# Patient Record
Sex: FEMALE | Race: WHITE | NOT HISPANIC OR LATINO | Employment: OTHER | URBAN - METROPOLITAN AREA
[De-identification: names, ages, dates, MRNs, and addresses within clinical notes are randomized per-mention and may not be internally consistent; named-entity substitution may affect disease eponyms.]

---

## 2023-05-12 PROBLEM — M10.9 GOUT: Status: ACTIVE | Noted: 2023-05-12

## 2023-05-12 PROBLEM — R35.0 URINARY FREQUENCY: Status: ACTIVE | Noted: 2023-05-12

## 2023-05-12 PROBLEM — G57.61 MORTON'S NEUROMA OF RIGHT FOOT: Status: ACTIVE | Noted: 2023-05-12

## 2023-05-12 PROBLEM — K63.5 COLON POLYPS: Status: ACTIVE | Noted: 2023-05-12

## 2023-05-12 PROBLEM — L30.9 DERMATITIS, ECZEMATOID: Status: ACTIVE | Noted: 2023-05-12

## 2023-05-12 PROBLEM — G47.00 INSOMNIA: Status: ACTIVE | Noted: 2023-05-12

## 2023-05-12 PROBLEM — E78.5 HYPERLIPIDEMIA: Status: ACTIVE | Noted: 2023-05-12

## 2023-05-12 PROBLEM — R94.4 DECREASED GFR: Status: ACTIVE | Noted: 2023-05-12

## 2023-05-12 PROBLEM — F43.22 ADJUSTMENT DISORDER WITH ANXIETY: Status: ACTIVE | Noted: 2023-05-12

## 2023-05-12 PROBLEM — K59.09 CHRONIC CONSTIPATION: Status: ACTIVE | Noted: 2023-05-12

## 2023-05-12 PROBLEM — G47.33 OSA ON CPAP: Status: ACTIVE | Noted: 2023-05-12

## 2023-05-12 RX ORDER — TRAZODONE HYDROCHLORIDE 50 MG/1
TABLET ORAL
COMMUNITY
End: 2023-05-24 | Stop reason: SDUPTHER

## 2023-05-12 RX ORDER — MULTIVITAMIN
1 TABLET ORAL DAILY
COMMUNITY
Start: 2019-03-07

## 2023-05-12 RX ORDER — ATORVASTATIN CALCIUM 20 MG/1
20 TABLET, FILM COATED ORAL NIGHTLY
COMMUNITY
End: 2023-05-24 | Stop reason: SDUPTHER

## 2023-05-12 RX ORDER — OLOPATADINE HYDROCHLORIDE 1 MG/ML
SOLUTION/ DROPS OPHTHALMIC
COMMUNITY
Start: 2022-11-14 | End: 2023-05-24 | Stop reason: ALTCHOICE

## 2023-05-12 RX ORDER — COLCHICINE 0.6 MG/1
1 TABLET ORAL 2 TIMES DAILY
COMMUNITY
Start: 2021-12-03 | End: 2023-05-24 | Stop reason: ALTCHOICE

## 2023-05-12 RX ORDER — CITALOPRAM 20 MG/1
1 TABLET, FILM COATED ORAL DAILY
COMMUNITY
Start: 2017-12-11 | End: 2023-05-24 | Stop reason: SDUPTHER

## 2023-05-12 RX ORDER — ALLOPURINOL 100 MG/1
100 TABLET ORAL DAILY
COMMUNITY
End: 2023-05-24 | Stop reason: SDUPTHER

## 2023-05-15 ENCOUNTER — APPOINTMENT (OUTPATIENT)
Dept: PRIMARY CARE | Facility: CLINIC | Age: 73
End: 2023-05-15
Payer: COMMERCIAL

## 2023-05-24 ENCOUNTER — OFFICE VISIT (OUTPATIENT)
Dept: PRIMARY CARE | Facility: CLINIC | Age: 73
End: 2023-05-24
Payer: MEDICARE

## 2023-05-24 VITALS — DIASTOLIC BLOOD PRESSURE: 68 MMHG | SYSTOLIC BLOOD PRESSURE: 120 MMHG

## 2023-05-24 DIAGNOSIS — M10.9 GOUT, UNSPECIFIED CAUSE, UNSPECIFIED CHRONICITY, UNSPECIFIED SITE: ICD-10-CM

## 2023-05-24 DIAGNOSIS — E78.5 HYPERLIPIDEMIA, UNSPECIFIED HYPERLIPIDEMIA TYPE: ICD-10-CM

## 2023-05-24 DIAGNOSIS — Z12.31 ENCOUNTER FOR SCREENING MAMMOGRAM FOR MALIGNANT NEOPLASM OF BREAST: ICD-10-CM

## 2023-05-24 DIAGNOSIS — R35.0 URINARY FREQUENCY: ICD-10-CM

## 2023-05-24 DIAGNOSIS — Z78.0 POST-MENOPAUSAL: ICD-10-CM

## 2023-05-24 DIAGNOSIS — F43.22 ADJUSTMENT DISORDER WITH ANXIETY: ICD-10-CM

## 2023-05-24 DIAGNOSIS — Z00.00 HEALTH CARE MAINTENANCE: Primary | ICD-10-CM

## 2023-05-24 DIAGNOSIS — G47.00 INSOMNIA, UNSPECIFIED TYPE: ICD-10-CM

## 2023-05-24 LAB
ALANINE AMINOTRANSFERASE (SGPT) (U/L) IN SER/PLAS: 14 U/L (ref 7–45)
ALBUMIN (G/DL) IN SER/PLAS: 4.4 G/DL (ref 3.4–5)
ALKALINE PHOSPHATASE (U/L) IN SER/PLAS: 78 U/L (ref 33–136)
ANION GAP IN SER/PLAS: 14 MMOL/L (ref 10–20)
ASPARTATE AMINOTRANSFERASE (SGOT) (U/L) IN SER/PLAS: 18 U/L (ref 9–39)
BILIRUBIN TOTAL (MG/DL) IN SER/PLAS: 0.4 MG/DL (ref 0–1.2)
CALCIUM (MG/DL) IN SER/PLAS: 9.2 MG/DL (ref 8.6–10.3)
CARBON DIOXIDE, TOTAL (MMOL/L) IN SER/PLAS: 27 MMOL/L (ref 21–32)
CHLORIDE (MMOL/L) IN SER/PLAS: 103 MMOL/L (ref 98–107)
CHOLESTEROL (MG/DL) IN SER/PLAS: 263 MG/DL (ref 0–199)
CHOLESTEROL IN HDL (MG/DL) IN SER/PLAS: 85.3 MG/DL
CHOLESTEROL/HDL RATIO: 3.1
CREATININE (MG/DL) IN SER/PLAS: 0.84 MG/DL (ref 0.5–1.05)
GFR FEMALE: 73 ML/MIN/1.73M2
GLUCOSE (MG/DL) IN SER/PLAS: 99 MG/DL (ref 74–99)
LDL: 159 MG/DL (ref 0–99)
POC APPEARANCE, URINE: CLEAR
POC BILIRUBIN, URINE: NEGATIVE
POC BLOOD, URINE: NEGATIVE
POC COLOR, URINE: YELLOW
POC GLUCOSE, URINE: NEGATIVE MG/DL
POC KETONES, URINE: NEGATIVE MG/DL
POC LEUKOCYTES, URINE: ABNORMAL
POC NITRITE,URINE: NEGATIVE
POC PH, URINE: 5.5 PH
POC PROTEIN, URINE: NEGATIVE MG/DL
POC SPECIFIC GRAVITY, URINE: 1.02
POC UROBILINOGEN, URINE: 0.2 EU/DL
POTASSIUM (MMOL/L) IN SER/PLAS: 4.8 MMOL/L (ref 3.5–5.3)
PROTEIN TOTAL: 6.8 G/DL (ref 6.4–8.2)
SODIUM (MMOL/L) IN SER/PLAS: 139 MMOL/L (ref 136–145)
THYROTROPIN (MIU/L) IN SER/PLAS BY DETECTION LIMIT <= 0.05 MIU/L: 1.92 MIU/L (ref 0.44–3.98)
TRIGLYCERIDE (MG/DL) IN SER/PLAS: 93 MG/DL (ref 0–149)
URATE (MG/DL) IN SER/PLAS: 6.4 MG/DL (ref 2.3–6.7)
UREA NITROGEN (MG/DL) IN SER/PLAS: 21 MG/DL (ref 6–23)
VLDL: 19 MG/DL (ref 0–40)

## 2023-05-24 PROCEDURE — 1160F RVW MEDS BY RX/DR IN RCRD: CPT | Performed by: FAMILY MEDICINE

## 2023-05-24 PROCEDURE — 81003 URINALYSIS AUTO W/O SCOPE: CPT | Performed by: FAMILY MEDICINE

## 2023-05-24 PROCEDURE — 84443 ASSAY THYROID STIM HORMONE: CPT

## 2023-05-24 PROCEDURE — 80053 COMPREHEN METABOLIC PANEL: CPT

## 2023-05-24 PROCEDURE — 1036F TOBACCO NON-USER: CPT | Performed by: FAMILY MEDICINE

## 2023-05-24 PROCEDURE — 99214 OFFICE O/P EST MOD 30 MIN: CPT | Performed by: FAMILY MEDICINE

## 2023-05-24 PROCEDURE — 84550 ASSAY OF BLOOD/URIC ACID: CPT

## 2023-05-24 PROCEDURE — 1159F MED LIST DOCD IN RCRD: CPT | Performed by: FAMILY MEDICINE

## 2023-05-24 PROCEDURE — 80061 LIPID PANEL: CPT

## 2023-05-24 RX ORDER — ATORVASTATIN CALCIUM 20 MG/1
20 TABLET, FILM COATED ORAL NIGHTLY
Qty: 90 TABLET | Refills: 0 | Status: SHIPPED | OUTPATIENT
Start: 2023-05-24 | End: 2023-05-25

## 2023-05-24 RX ORDER — CITALOPRAM 20 MG/1
20 TABLET, FILM COATED ORAL DAILY
Qty: 90 TABLET | Refills: 0 | Status: SHIPPED | OUTPATIENT
Start: 2023-05-24 | End: 2023-09-18 | Stop reason: SDUPTHER

## 2023-05-24 RX ORDER — SULFAMETHOXAZOLE AND TRIMETHOPRIM 800; 160 MG/1; MG/1
1 TABLET ORAL 2 TIMES DAILY
Qty: 6 TABLET | Refills: 0 | Status: SHIPPED | OUTPATIENT
Start: 2023-05-24 | End: 2023-05-27

## 2023-05-24 RX ORDER — TRAZODONE HYDROCHLORIDE 50 MG/1
TABLET ORAL
Qty: 90 TABLET | Refills: 0 | Status: SHIPPED | OUTPATIENT
Start: 2023-05-24 | End: 2023-09-18 | Stop reason: SDUPTHER

## 2023-05-24 RX ORDER — ALLOPURINOL 100 MG/1
100 TABLET ORAL DAILY
Qty: 90 TABLET | Refills: 0 | Status: SHIPPED | OUTPATIENT
Start: 2023-05-24 | End: 2023-08-22

## 2023-05-24 NOTE — PATIENT INSTRUCTIONS
You are due for the bivalent COVID booster.    Follow up fasting (no alcohol for 48 hours and just water for 14 hours) in six months for your next routine appointment.  In general, take any medications on schedule (except for types of Insulin).

## 2023-05-24 NOTE — LETTER
June 5, 2023     Munira Bowen  [id number] [facility] [id number]      Dear Ms. Bowen:    Below are the results from your recent visit:    Resulted Orders   Comprehensive Metabolic Panel   Result Value Ref Range    Glucose 99 74 - 99 mg/dL    Sodium 139 136 - 145 mmol/L    Potassium 4.8 3.5 - 5.3 mmol/L    Chloride 103 98 - 107 mmol/L    Bicarbonate 27 21 - 32 mmol/L    Anion Gap 14 10 - 20 mmol/L    Urea Nitrogen 21 6 - 23 mg/dL    Creatinine 0.84 0.50 - 1.05 mg/dL    GFR Female 73 >90 mL/min/1.73m2      Comment:       CALCULATIONS OF ESTIMATED GFR ARE PERFORMED   USING THE 2021 CKD-EPI STUDY REFIT EQUATION   WITHOUT THE RACE VARIABLE FOR THE IDMS-TRACEABLE   CREATININE METHODS.    https://jasn.asnjournals.org/content/early/2021/09/22/ASN.4789091485    Calcium 9.2 8.6 - 10.3 mg/dL    Albumin 4.4 3.4 - 5.0 g/dL    Alkaline Phosphatase 78 33 - 136 U/L    Total Protein 6.8 6.4 - 8.2 g/dL    AST 18 9 - 39 U/L    Total Bilirubin 0.4 0.0 - 1.2 mg/dL    ALT (SGPT) 14 7 - 45 U/L      Comment:       Patients treated with Sulfasalazine may generate    falsely decreased results for ALT.   Lipid Panel   Result Value Ref Range    Cholesterol 263 (H) 0 - 199 mg/dL      Comment:      .      AGE      DESIRABLE   BORDERLINE HIGH   HIGH     0-19 Y     0 - 169       170 - 199     >/= 200    20-24 Y     0 - 189       190 - 224     >/= 225         >24 Y     0 - 199       200 - 239     >/= 240   **All ranges are based on fasting samples. Specific   therapeutic targets will vary based on patient-specific   cardiac risk.  .   Pediatric guidelines reference:Pediatrics 2011, 128(S5).   Adult guidelines reference: NCEP ATPIII Guidelines,     SESAR 2001, 258:2486-97  .   Venipuncture immediately after or during the    administration of Metamizole may lead to falsely   low results. Testing should be performed immediately   prior to Metamizole dosing.    HDL 85.3 mg/dL      Comment:      .      AGE      VERY LOW   LOW      NORMAL    HIGH       0-19 Y       < 35   < 40     40-45     ----    20-24 Y       ----   < 40       >45     ----      >24 Y       ----   < 40     40-60      >60  .    Cholesterol/HDL Ratio 3.1       Comment:      REF VALUES  DESIRABLE  < 3.4  HIGH RISK  > 5.0     (H) 0 - 99 mg/dL      Comment:      .                           NEAR      BORD      AGE      DESIRABLE  OPTIMAL    HIGH     HIGH     VERY HIGH     0-19 Y     0 - 109     ---    110-129   >/= 130     ----    20-24 Y     0 - 119     ---    120-159   >/= 160     ----      >24 Y     0 -  99   100-129  130-159   160-189     >/=190  .    VLDL 19 0 - 40 mg/dL    Triglycerides 93 0 - 149 mg/dL      Comment:      .      AGE      DESIRABLE   BORDERLINE HIGH   HIGH     VERY HIGH   0 D-90 D    19 - 174         ----         ----        ----  91 D- 9 Y     0 -  74        75 -  99     >/= 100      ----    10-19 Y     0 -  89        90 - 129     >/= 130      ----    20-24 Y     0 - 114       115 - 149     >/= 150      ----         >24 Y     0 - 149       150 - 199    200- 499    >/= 500  .   Venipuncture immediately after or during the    administration of Metamizole may lead to falsely   low results. Testing should be performed immediately   prior to Metamizole dosing.   Thyroid Stimulating Hormone   Result Value Ref Range    TSH 1.92 0.44 - 3.98 mIU/L      Comment:       TSH testing is performed using different testing    methodology at [facility] than at other    [facility]. Direct result comparisons should    only be made within the same method.   POCT UA Automated manually resulted   Result Value Ref Range    POC Color, Urine Yellow Straw, Yellow, Light Yellow    POC Appearance, Urine Clear Clear    POC Specific Gravity, Urine 1.020 1.005 - 1.035    POC PH, Urine 5.5 No Reference Range Established PH    POC Protein, Urine NEGATIVE NEGATIVE, 30 (1+) mg/dl    POC Glucose, Urine NEGATIVE NEGATIVE mg/dl    POC Blood, Urine NEGATIVE NEGATIVE     POC Ketones, Urine NEGATIVE NEGATIVE mg/dl    POC Bilirubin, Urine NEGATIVE NEGATIVE    POC Urobilinogen, Urine 0.2 0.2, 1.0 EU/DL    Poc Nitrate, Urine NEGATIVE NEGATIVE    POC Leukocytes, Urine TRACE (A) NEGATIVE   Uric Acid   Result Value Ref Range    Uric Acid 6.4 2.3 - 6.7 mg/dL      Comment:       Venipuncture immediately after or during the    administration of Metamizole may lead to falsely   low results. Testing should be performed immediately   prior to Metamizole dosing.   XR DEXA bone density    Narrative    Interpreted By:  OSBALDO PAULSON MD  Name: ERIK OLIVER    Patient ID: [id number] YOB: 1950 Height: 58.0 in.      Gender:     Female   Exam Date:  06/02/2023 Weight: 130.0 lbs.    Indications: former tobacco user    Fractures:                          Treatments:                            LEFT FEMUR - TOTAL   The bone mineral density : 0.871 g/cm2   T-score : -1.1    % of young normal mean :86 %   Z-score : 0.5    % of age matched mean : 108 %    % change vs. Previous : -     % change vs. Baseline : baseline    *Indicates significant change based on 95% confidence interval.      LEFT FEMUR - NECK   The bone mineral density : 0.797 g/cm2   T-score : -1.7    % of young normal mean: 77 %   Z-score : 0.1    % of age matched mean : 101 %    % change vs. Previous : -     % change vs. Baseline : baseline    *Indicates significant change based on 95% confidence interval.      SPINE L1-L4   The bone mineral density is 1.366 g/cm2   T-score : 1.5   % of young normal mean is 116 %   Z-score : 3.3    % of age matched mean is 140 %    % change vs. Previous : -     % change vs. Baseline : baseline    *Indicates significant change based on 95% confidence interval.       World Health Organization (WHO) criteria for post-menopausal,    Women:     Normal:       T-score at or above -1 SD          Osteopenia:   T-score between -1 and -2.5 SD     Osteoporosis: T-score at or below -2.5 SD            10-Year Fracture Risk:   Major Osteoporotic Fracture 11.4 %    Hip Fracture                2.2 %     Reported Risk Factors       None         Interpretation:   The BMD measured at Femur Neck is 0.797 g/cm2 with a T-score of -1.7   is considered moderately low.  Treatment is advised if there are   other risk factors.       Follow up recommended on June 2025 or sooner as clinically warranted.     The test results show that your current treatment is working. Please {Therapies; lab letter directions:23656}. We recommend that you repeat the above test(s) in {Numbers; 1-10:12971} {Time; units w/plural:11}.    If you have any questions or concerns, please don't hesitate to call.         Sincerely,        Smith Avina MD

## 2023-05-24 NOTE — PROGRESS NOTES
Subjective   Patient ID: [id number]     Munira Bowen is a 72 y.o. female who presents for Med Management.    HPI  [place] weepy this morning;  describes productive therapeutic relationship with Jenise Heaton;  sleeping well and meds appear to be appropriate.  Review of Systems    CARDIO- No chest pain or pressure, nausea, diaphoresis, paresthesias, dizziness, or syncope with or without exertion  GI-No blood in stool, tarry stools, pain, vomiting, heartburn, constipation or diarrhea  PULM-No wheezing, coughing or shortness of breath  UROL-No frequency, urgency, blood in urine, or incontinence  ENDO- No change in hair, voice, skin, weight or temperature tolerance   MSK-No locking, giving way/swelling of joints  NEURO- No headaches, hx of concussion, falls in the last year or seizure  DERM-No rashes, blanching or change in any moles    Objective     /68 (BP Location: Left arm, Patient Position: Sitting)      Physical Exam    Neck-supple without lymphadenopathy or thyromegaly; no carotid bruits  Throat- without erythema or exudate, uvula in midlineNeck-supple without lymphadenopathy or thyromegaly; no carotid bruits  Heart- regular rate and rhythm, normal s1 and s2 without murmur or gallop  Lungs-clear to auscultation  Abdomen-soft, positive bowel sounds, without masses, HSmegaly or pain     Assessment/Plan     Problem List Items Addressed This Visit          Other    Gout    Relevant Orders    Uric Acid    Hyperlipidemia    Relevant Orders    Comprehensive Metabolic Panel    Lipid Panel    Thyroid Stimulating Hormone    Urinary frequency    Relevant Orders    POCT UA Automated manually resulted    Health care maintenance - Primary    Relevant Orders    BI mammo bilateral screening tomosynthesis    Post-menopausal    Relevant Orders    XR DEXA bone density     Other Visit Diagnoses       Encounter for screening mammogram for malignant neoplasm of breast        Relevant Orders    BI mammo bilateral screening  tomosynthesis          You are due for the bivalent COVID booster.    Follow up fasting (no alcohol for 48 hours and just water for 14 hours) in six months for your next routine appointment.  In general, take any medications on schedule (except for types of Insulin).    Smith Avina MD

## 2023-05-25 RX ORDER — ATORVASTATIN CALCIUM 40 MG/1
40 TABLET, FILM COATED ORAL DAILY
Qty: 90 TABLET | Refills: 1 | Status: SHIPPED | OUTPATIENT
Start: 2023-05-25 | End: 2023-11-29 | Stop reason: SDUPTHER

## 2023-05-26 ENCOUNTER — LAB (OUTPATIENT)
Dept: LAB | Facility: LAB | Age: 73
End: 2023-05-26
Payer: MEDICARE

## 2023-05-26 DIAGNOSIS — R35.0 URINARY FREQUENCY: ICD-10-CM

## 2023-05-26 PROCEDURE — 87086 URINE CULTURE/COLONY COUNT: CPT

## 2023-05-27 LAB — URINE CULTURE: NORMAL

## 2023-06-05 PROBLEM — M85.88 OSTEOPENIA OF LUMBAR SPINE: Status: ACTIVE | Noted: 2023-06-05

## 2023-09-14 ENCOUNTER — OFFICE VISIT (OUTPATIENT)
Dept: PRIMARY CARE | Facility: CLINIC | Age: 73
End: 2023-09-14
Payer: MEDICARE

## 2023-09-14 DIAGNOSIS — Z23 NEED FOR VACCINATION: ICD-10-CM

## 2023-09-14 PROCEDURE — 1036F TOBACCO NON-USER: CPT | Performed by: FAMILY MEDICINE

## 2023-09-14 PROCEDURE — 90662 IIV NO PRSV INCREASED AG IM: CPT | Performed by: FAMILY MEDICINE

## 2023-09-14 PROCEDURE — G0008 ADMIN INFLUENZA VIRUS VAC: HCPCS | Performed by: FAMILY MEDICINE

## 2023-09-14 PROCEDURE — 1159F MED LIST DOCD IN RCRD: CPT | Performed by: FAMILY MEDICINE

## 2023-09-14 PROCEDURE — 1126F AMNT PAIN NOTED NONE PRSNT: CPT | Performed by: FAMILY MEDICINE

## 2023-09-14 PROCEDURE — 1160F RVW MEDS BY RX/DR IN RCRD: CPT | Performed by: FAMILY MEDICINE

## 2023-09-15 ENCOUNTER — TELEPHONE (OUTPATIENT)
Dept: PRIMARY CARE | Facility: CLINIC | Age: 73
End: 2023-09-15
Payer: MEDICARE

## 2023-09-15 DIAGNOSIS — F43.22 ADJUSTMENT DISORDER WITH ANXIETY: ICD-10-CM

## 2023-09-15 DIAGNOSIS — G47.00 INSOMNIA, UNSPECIFIED TYPE: ICD-10-CM

## 2023-09-15 NOTE — TELEPHONE ENCOUNTER
REFILL  MEDICATION:     Trazodone 50 MG; Take 1/2 to 1 tablet at bedtime to help with sleep.    Citalopram 20 MG; Take 1 tablet once daily.     PHARM: [facility]   PHARM NUMBER: [phone]    LR: 5-24-23   90 tablets with 0 refills for both medications   LV: 5-24-23  NV: 11-24-23

## 2023-09-18 RX ORDER — TRAZODONE HYDROCHLORIDE 50 MG/1
TABLET ORAL
Qty: 90 TABLET | Refills: 0 | Status: SHIPPED | OUTPATIENT
Start: 2023-09-18 | End: 2024-02-23 | Stop reason: SDUPTHER

## 2023-09-18 RX ORDER — CITALOPRAM 20 MG/1
20 TABLET, FILM COATED ORAL DAILY
Qty: 90 TABLET | Refills: 0 | Status: SHIPPED | OUTPATIENT
Start: 2023-09-18 | End: 2024-02-23 | Stop reason: SDUPTHER

## 2023-11-24 ENCOUNTER — APPOINTMENT (OUTPATIENT)
Dept: PRIMARY CARE | Facility: CLINIC | Age: 73
End: 2023-11-24
Payer: MEDICARE

## 2023-11-29 ENCOUNTER — OFFICE VISIT (OUTPATIENT)
Dept: PRIMARY CARE | Facility: CLINIC | Age: 73
End: 2023-11-29
Payer: MEDICARE

## 2023-11-29 VITALS
TEMPERATURE: 97.6 F | BODY MASS INDEX: 27.14 KG/M2 | WEIGHT: 131 LBS | DIASTOLIC BLOOD PRESSURE: 85 MMHG | SYSTOLIC BLOOD PRESSURE: 143 MMHG

## 2023-11-29 DIAGNOSIS — G47.33 OSA ON CPAP: ICD-10-CM

## 2023-11-29 DIAGNOSIS — F43.22 ADJUSTMENT DISORDER WITH ANXIETY: Primary | ICD-10-CM

## 2023-11-29 DIAGNOSIS — H10.11 ALLERGIC CONJUNCTIVITIS OF RIGHT EYE: ICD-10-CM

## 2023-11-29 DIAGNOSIS — E78.5 HYPERLIPIDEMIA, UNSPECIFIED HYPERLIPIDEMIA TYPE: ICD-10-CM

## 2023-11-29 PROBLEM — H91.93 BILATERAL HEARING LOSS: Status: ACTIVE | Noted: 2023-11-29

## 2023-11-29 LAB
POC FINGERSTICK BLOOD GLUCOSE: 101 MG/DL (ref 70–100)
POC HDL CHOLESTEROL: 70 MG/DL (ref 0–50)
POC LDL CHOLESTEROL: 108 MG/DL (ref 0–100)
POC NON-HDL CHOLESTEROL: 125 MG/DL (ref 0–130)
POC TOTAL CHOLESTEROL/HDL RATIO: 2.8 (ref 0–4.5)
POC TOTAL CHOLESTEROL: 195 MG/DL (ref 0–199)
POC TRIGLYCERIDES: 85 MG/DL (ref 0–150)

## 2023-11-29 PROCEDURE — 1036F TOBACCO NON-USER: CPT | Performed by: FAMILY MEDICINE

## 2023-11-29 PROCEDURE — 1159F MED LIST DOCD IN RCRD: CPT | Performed by: FAMILY MEDICINE

## 2023-11-29 PROCEDURE — 1123F ACP DISCUSS/DSCN MKR DOCD: CPT | Performed by: FAMILY MEDICINE

## 2023-11-29 PROCEDURE — G0439 PPPS, SUBSEQ VISIT: HCPCS | Performed by: FAMILY MEDICINE

## 2023-11-29 PROCEDURE — 82962 GLUCOSE BLOOD TEST: CPT | Performed by: FAMILY MEDICINE

## 2023-11-29 PROCEDURE — 1170F FXNL STATUS ASSESSED: CPT | Performed by: FAMILY MEDICINE

## 2023-11-29 PROCEDURE — 1126F AMNT PAIN NOTED NONE PRSNT: CPT | Performed by: FAMILY MEDICINE

## 2023-11-29 PROCEDURE — 1160F RVW MEDS BY RX/DR IN RCRD: CPT | Performed by: FAMILY MEDICINE

## 2023-11-29 PROCEDURE — 99214 OFFICE O/P EST MOD 30 MIN: CPT | Performed by: FAMILY MEDICINE

## 2023-11-29 PROCEDURE — 80061 LIPID PANEL: CPT | Performed by: FAMILY MEDICINE

## 2023-11-29 RX ORDER — ERYTHROMYCIN 5 MG/G
OINTMENT OPHTHALMIC
COMMUNITY

## 2023-11-29 RX ORDER — OLOPATADINE HYDROCHLORIDE 1 MG/ML
1 SOLUTION/ DROPS OPHTHALMIC 2 TIMES DAILY
COMMUNITY

## 2023-11-29 RX ORDER — ATORVASTATIN CALCIUM 40 MG/1
40 TABLET, FILM COATED ORAL DAILY
Qty: 90 TABLET | Refills: 1 | Status: SHIPPED | OUTPATIENT
Start: 2023-11-29 | End: 2024-02-23 | Stop reason: SDUPTHER

## 2023-11-29 ASSESSMENT — ENCOUNTER SYMPTOMS
DEPRESSION: 1
LOSS OF SENSATION IN FEET: 0
OCCASIONAL FEELINGS OF UNSTEADINESS: 0

## 2023-11-29 ASSESSMENT — ACTIVITIES OF DAILY LIVING (ADL)
GROCERY_SHOPPING: INDEPENDENT
TAKING_MEDICATION: INDEPENDENT
DOING_HOUSEWORK: INDEPENDENT
BATHING: INDEPENDENT
DRESSING: INDEPENDENT
MANAGING_FINANCES: INDEPENDENT

## 2023-11-29 ASSESSMENT — PATIENT HEALTH QUESTIONNAIRE - PHQ9
2. FEELING DOWN, DEPRESSED OR HOPELESS: NOT AT ALL
SUM OF ALL RESPONSES TO PHQ9 QUESTIONS 1 AND 2: 0
1. LITTLE INTEREST OR PLEASURE IN DOING THINGS: NOT AT ALL

## 2023-11-29 NOTE — PATIENT INSTRUCTIONS
You are eligible for the COVID booster.    Follow up fasting (no alcohol for 48 hours and just water for 14 hours) in six months for your next routine appointment.  In general, take any medications on schedule (except for types of Insulin).

## 2023-11-29 NOTE — PROGRESS NOTES
Subjective   Reason for Visit: Munira Bowen is an 73 y.o. female here for a Medicare Wellness visit.        In the past 2 weeks this patient has not felt down, depressed, hopeless, lost interest or pleasure in doing things or thought of harming herself or others. The patient has not fallen in the last six months and has no loose rugs,  uneven floors or poor lighting at home.Advance Care Planning discussion was held.  The patient has no spiritual/Sabianist/cultural values or needs that we need to know. The patient does not feel threatened or abused physically, emotionally or sexually.  The patient feels safe in the home.  In the past month, there was not a day when the patient of anyone in the patient's family went hungry because there was not enough food.  The patient denies that they or the person with them has problems with hearing, speaking, reading, moving around or learning.  The patient states they are comfortable filling out medical forms.    Reviewed all medications by prescribing practitioner or clinical pharmacist (such as prescriptions, OTCs, herbal therapies and supplements) and documented in the medical record.    HPI  Taking meds as directed without tolerability or affordability issues; no complaints today.    Patient Care Team:  Smith Avina MD as PCP - General  Smith Avina MD as PCP - [facility] Attributed Provider     Review of Systems  General-denies weakness or myalgias  Opth-no dry eyes, or blurry vision; has had a few days of itchy right eye and using olpatidine drops  ENT-No , tinnitus or vertigo  Neck-no unexplained swelling in neck or pain  ENDO-no voice, skin, hair or change in temperature tolerance  HEM-no unexplained bruising or bleeding  PSYCH-mood is good; waking up rested without gasping or restless legs;  CPAP is too bothersome and so she stopped using it;  sleeps well with the trazodone    Objective   Vitals:  /85 (BP Location: Right arm, Patient Position: Sitting)    Temp 36.4 °C (97.6 °F) (Oral)   Wt 59.4 kg (131 lb)   BMI 27.14 kg/m²       Physical Exam  General- well defined, well nourished, well hydrated individual in NAD  Eyes-pupils equal round, reactive to light and accommodation, fundi with normal cup/disc ratio, conjunctiva on right with mild redness or discharge  Head-normocephalic without masses or tenderness  Ears-normal pinnae, and canals, with normal landmarks and light reflex of tympanic membranes bilaterally  Nose-septum in the midline, normal mucosa bilaterally  Throat- without erythema or exudate, uvula in midline  Neck-supple without lymphadenopathy or thyromegaly; no carotid bruits  Heart- regular rate and rhythm, normal s1 and s2 without murmur or gallop;  no edema  Lungs-clear to auscultation  Abdomen-soft, positive bowel sounds, without masses, HSmegaly or pain     Assessment/Plan   Problem List Items Addressed This Visit       Adjustment disorder with anxiety - Primary    Hyperlipidemia    Relevant Medications    atorvastatin (Lipitor) 40 mg tablet    Other Relevant Orders    POCT fingerstick glucose manually resulted    POCT Lipid Panel manually resulted    ALCIDES on CPAP    Allergic conjunctivitis of right eye     You are eligible for the COVID booster.    Follow up fasting (no alcohol for 48 hours and just water for 14 hours) in six months for your next routine appointment.  In general, take any medications on schedule (except for types of Insulin).

## 2024-01-17 ENCOUNTER — TELEPHONE (OUTPATIENT)
Dept: PRIMARY CARE | Facility: CLINIC | Age: 74
End: 2024-01-17
Payer: MEDICARE

## 2024-01-17 DIAGNOSIS — M10.9 GOUT, UNSPECIFIED CAUSE, UNSPECIFIED CHRONICITY, UNSPECIFIED SITE: ICD-10-CM

## 2024-01-17 RX ORDER — ALLOPURINOL 100 MG/1
100 TABLET ORAL DAILY
Qty: 30 TABLET | Refills: 11 | Status: SHIPPED | OUTPATIENT
Start: 2024-01-17 | End: 2024-02-23 | Stop reason: SDUPTHER

## 2024-01-17 NOTE — TELEPHONE ENCOUNTER
Refill:    Allopurinol 100mg daily    Pharm: [facility] # [phone]    LR: 05/24/23 qty 90 no refills  LV: 11/29/23  NV: 02/28/24

## 2024-02-22 PROBLEM — K63.5 COLON POLYPS: Status: RESOLVED | Noted: 2023-05-12 | Resolved: 2024-02-22

## 2024-02-22 PROBLEM — N18.2 CKD (CHRONIC KIDNEY DISEASE) STAGE 2, GFR 60-89 ML/MIN: Status: ACTIVE | Noted: 2023-05-12

## 2024-02-23 ENCOUNTER — OFFICE VISIT (OUTPATIENT)
Dept: PRIMARY CARE | Facility: CLINIC | Age: 74
End: 2024-02-23
Payer: MEDICARE

## 2024-02-23 VITALS
BODY MASS INDEX: 27.77 KG/M2 | DIASTOLIC BLOOD PRESSURE: 80 MMHG | WEIGHT: 134 LBS | SYSTOLIC BLOOD PRESSURE: 131 MMHG | TEMPERATURE: 98.5 F

## 2024-02-23 DIAGNOSIS — J06.9 VIRAL UPPER RESPIRATORY TRACT INFECTION: ICD-10-CM

## 2024-02-23 DIAGNOSIS — H10.11 ALLERGIC CONJUNCTIVITIS OF RIGHT EYE: ICD-10-CM

## 2024-02-23 DIAGNOSIS — M85.88 OSTEOPENIA OF LUMBAR SPINE: ICD-10-CM

## 2024-02-23 DIAGNOSIS — M10.9 GOUT, UNSPECIFIED CAUSE, UNSPECIFIED CHRONICITY, UNSPECIFIED SITE: ICD-10-CM

## 2024-02-23 DIAGNOSIS — F43.22 ADJUSTMENT DISORDER WITH ANXIETY: ICD-10-CM

## 2024-02-23 DIAGNOSIS — L30.9 ECZEMA, UNSPECIFIED TYPE: ICD-10-CM

## 2024-02-23 DIAGNOSIS — N18.2 CKD (CHRONIC KIDNEY DISEASE) STAGE 2, GFR 60-89 ML/MIN: ICD-10-CM

## 2024-02-23 DIAGNOSIS — K59.09 CHRONIC CONSTIPATION: ICD-10-CM

## 2024-02-23 DIAGNOSIS — E78.5 HYPERLIPIDEMIA, UNSPECIFIED HYPERLIPIDEMIA TYPE: Primary | ICD-10-CM

## 2024-02-23 DIAGNOSIS — R35.0 URINARY FREQUENCY: ICD-10-CM

## 2024-02-23 DIAGNOSIS — G47.00 INSOMNIA, UNSPECIFIED TYPE: ICD-10-CM

## 2024-02-23 PROCEDURE — 1126F AMNT PAIN NOTED NONE PRSNT: CPT | Performed by: FAMILY MEDICINE

## 2024-02-23 PROCEDURE — 1036F TOBACCO NON-USER: CPT | Performed by: FAMILY MEDICINE

## 2024-02-23 PROCEDURE — 99214 OFFICE O/P EST MOD 30 MIN: CPT | Performed by: FAMILY MEDICINE

## 2024-02-23 PROCEDURE — 1159F MED LIST DOCD IN RCRD: CPT | Performed by: FAMILY MEDICINE

## 2024-02-23 PROCEDURE — 1157F ADVNC CARE PLAN IN RCRD: CPT | Performed by: FAMILY MEDICINE

## 2024-02-23 RX ORDER — ATORVASTATIN CALCIUM 40 MG/1
40 TABLET, FILM COATED ORAL DAILY
Qty: 90 TABLET | Refills: 1 | Status: SHIPPED | OUTPATIENT
Start: 2024-02-23 | End: 2024-08-21

## 2024-02-23 RX ORDER — ALLOPURINOL 100 MG/1
100 TABLET ORAL DAILY
Qty: 90 TABLET | Refills: 1 | Status: SHIPPED | OUTPATIENT
Start: 2024-02-23 | End: 2024-08-21

## 2024-02-23 RX ORDER — TRAZODONE HYDROCHLORIDE 50 MG/1
TABLET ORAL
Qty: 90 TABLET | Refills: 0 | Status: SHIPPED | OUTPATIENT
Start: 2024-02-23

## 2024-02-23 RX ORDER — CITALOPRAM 20 MG/1
20 TABLET, FILM COATED ORAL DAILY
Qty: 90 TABLET | Refills: 1 | Status: SHIPPED | OUTPATIENT
Start: 2024-02-23 | End: 2024-08-21

## 2024-02-23 NOTE — PATIENT INSTRUCTIONS
You are eligible for the COVID booster.  Without a recent (within 90 days) challenge (booster or infection) your immune system will be three days behind in protecting you from the infection.  You will infect approximately five people by that time.    Follow up fasting (no alcohol for 48 hours and just water for 14 hours) in six months for your next routine appointment.  In general, take any medications on schedule (except for types of Insulin).

## 2024-02-23 NOTE — PROGRESS NOTES
Subjective   Patient ID: [id number]     Munira Bowen is a 73 y.o. female who presents for Med Management.    HPI  Taking meds as directed without tolerability or affordability issues; no complaints today.;  had COVID on 02FEB2024 and Munira got URI 13FEB2024 wit a negative COVID test on 21FEB2024    Review of Systems  CARDIO- No chest pain or pressure, nausea, diaphoresis, paresthesias, dizziness, or syncope with or without exertion  GI-No blood in stool, tarry stools, pain, vomiting, heartburn, constipation or diarrhea  PULM-No wheezing, or shortness of breath; coughing that is nonproductive  UROL-No frequency, urgency, blood in urine, or incontinence  Musculoskeletal-No locking, giving way/swelling of joints  Neurology- No headaches, hx of concussion, falls in the last year or seizure  Allergy/Immunology-No history of  itching;  has occasional sneezing   Dermatology-No rashes, blanching or  change in any moles;  still with dry skin    Objective     /80 (BP Location: Left arm, Patient Position: Sitting)   Temp 36.9 °C (98.5 °F) (Oral)   Wt 60.8 kg (134 lb)   BMI 27.77 kg/m²      Physical Exam  General- well defined, well nourished, well hydrated individual in NAD  Eyes-conjunctiva without redness or discharge  Head-normocephalic without masses or tenderness  Ears-normal pinnae, and canals, with normal landmarks and light reflex of tympanic membranes bilaterally  Nose-septum in the midline, normal mucosa bilaterally  Throat- without erythema or exudate, uvula in midline  Neck-suple without lymphadenopathy or thyromegaly; no carotid bruits  Throat- without erythema or exudate, uvula in midlineNeck-supple without lymphadenopathy or thyromegaly; no carotid bruits  Heart- regular rate and rhythm, normal s1 and s2 without murmur or gallop  Lungs-clear to auscultation  Abdomen-soft, positive bowel sounds, without masses, HSmegaly or pain     Assessment/Plan     Problem List Items Addressed This Visit        Adjustment disorder with anxiety    Relevant Medications    citalopram (CeleXA) 20 mg tablet    RESOLVED: Chronic constipation    CKD (chronic kidney disease) stage 2, GFR 60-89 ml/min    Dermatitis, eczematoid    Gout    Relevant Medications    allopurinol (Zyloprim) 100 mg tablet    Other Relevant Orders    Uric acid    Hyperlipidemia - Primary    Relevant Medications    atorvastatin (Lipitor) 40 mg tablet    Other Relevant Orders    TSH    Lipid panel    Comprehensive metabolic panel    Insomnia    Relevant Medications    traZODone (Desyrel) 50 mg tablet    Urinary frequency    Relevant Orders    POCT UA Automated manually resulted    Osteopenia of lumbar spine    RESOLVED: Allergic conjunctivitis of right eye    URI (upper respiratory infection)     This Viral Upper Respiratory Infection should resolve on its own after a total of 10 to 14 days.  Please call if you develop shortness of breath, a persistent temperature over 100 degrees in 48 hours, or feel worse in any way.   Remember to push fluids, get plenty of rest and take Tylenol and/or Chloraseptic products for discomfort.    You are eligible for the COVID booster.  Without a recent (within 90 days) challenge (booster or infection) your immune system will be three days behind in protecting you from the infection.  You will infect approximately five people by that time.    Your kidney function is impaired.  Make sure that you do not become dehydrated, and take only Tylenol for pain, avoiding all NSAIDS.    Follow up fasting (no alcohol for 48 hours and just water for 14 hours) in six months for your next routine appointment.  In general, take any medications on schedule (except for types of Insulin).      Smith Avina MD

## 2024-03-19 ENCOUNTER — LAB (OUTPATIENT)
Dept: LAB | Facility: LAB | Age: 74
End: 2024-03-19
Payer: MEDICARE

## 2024-03-19 DIAGNOSIS — E78.5 HYPERLIPIDEMIA, UNSPECIFIED HYPERLIPIDEMIA TYPE: ICD-10-CM

## 2024-03-19 DIAGNOSIS — M10.9 GOUT, UNSPECIFIED CAUSE, UNSPECIFIED CHRONICITY, UNSPECIFIED SITE: ICD-10-CM

## 2024-03-19 LAB
ALBUMIN SERPL BCP-MCNC: 4.2 G/DL (ref 3.4–5)
ALP SERPL-CCNC: 83 U/L (ref 33–136)
ALT SERPL W P-5'-P-CCNC: 14 U/L (ref 7–45)
ANION GAP SERPL CALC-SCNC: 10 MMOL/L (ref 10–20)
AST SERPL W P-5'-P-CCNC: 19 U/L (ref 9–39)
BILIRUB SERPL-MCNC: 0.6 MG/DL (ref 0–1.2)
BUN SERPL-MCNC: 13 MG/DL (ref 6–23)
CALCIUM SERPL-MCNC: 9.1 MG/DL (ref 8.6–10.6)
CHLORIDE SERPL-SCNC: 104 MMOL/L (ref 98–107)
CO2 SERPL-SCNC: 29 MMOL/L (ref 21–32)
CREAT SERPL-MCNC: 0.78 MG/DL (ref 0.5–1.05)
EGFRCR SERPLBLD CKD-EPI 2021: 80 ML/MIN/1.73M*2
GLUCOSE SERPL-MCNC: 94 MG/DL (ref 74–99)
POTASSIUM SERPL-SCNC: 3.9 MMOL/L (ref 3.5–5.3)
PROT SERPL-MCNC: 6.4 G/DL (ref 6.4–8.2)
SODIUM SERPL-SCNC: 139 MMOL/L (ref 136–145)
TSH SERPL-ACNC: 2.24 MIU/L (ref 0.44–3.98)
URATE SERPL-MCNC: 5.5 MG/DL (ref 2.3–6.7)

## 2024-03-19 PROCEDURE — 84550 ASSAY OF BLOOD/URIC ACID: CPT

## 2024-03-19 PROCEDURE — 84443 ASSAY THYROID STIM HORMONE: CPT

## 2024-03-19 PROCEDURE — 36415 COLL VENOUS BLD VENIPUNCTURE: CPT

## 2024-03-19 PROCEDURE — 80053 COMPREHEN METABOLIC PANEL: CPT

## 2024-05-14 ENCOUNTER — HOSPITAL ENCOUNTER (EMERGENCY)
Facility: HOSPITAL | Age: 74
Discharge: HOME | End: 2024-05-14
Attending: STUDENT IN AN ORGANIZED HEALTH CARE EDUCATION/TRAINING PROGRAM
Payer: MEDICARE

## 2024-05-14 ENCOUNTER — APPOINTMENT (OUTPATIENT)
Dept: CARDIOLOGY | Facility: HOSPITAL | Age: 74
End: 2024-05-14
Payer: MEDICARE

## 2024-05-14 VITALS
TEMPERATURE: 97.7 F | BODY MASS INDEX: 27.29 KG/M2 | DIASTOLIC BLOOD PRESSURE: 86 MMHG | HEIGHT: 58 IN | WEIGHT: 130 LBS | OXYGEN SATURATION: 97 % | SYSTOLIC BLOOD PRESSURE: 148 MMHG | HEART RATE: 74 BPM | RESPIRATION RATE: 18 BRPM

## 2024-05-14 DIAGNOSIS — M79.605 PAIN OF LEFT LOWER EXTREMITY: Primary | ICD-10-CM

## 2024-05-14 DIAGNOSIS — M79.605 PAIN IN LEFT LEG: ICD-10-CM

## 2024-05-14 PROCEDURE — 99284 EMERGENCY DEPT VISIT MOD MDM: CPT | Mod: 25

## 2024-05-14 PROCEDURE — 93970 EXTREMITY STUDY: CPT

## 2024-05-14 PROCEDURE — 99284 EMERGENCY DEPT VISIT MOD MDM: CPT | Performed by: STUDENT IN AN ORGANIZED HEALTH CARE EDUCATION/TRAINING PROGRAM

## 2024-05-14 ASSESSMENT — COLUMBIA-SUICIDE SEVERITY RATING SCALE - C-SSRS
1. IN THE PAST MONTH, HAVE YOU WISHED YOU WERE DEAD OR WISHED YOU COULD GO TO SLEEP AND NOT WAKE UP?: NO
6. HAVE YOU EVER DONE ANYTHING, STARTED TO DO ANYTHING, OR PREPARED TO DO ANYTHING TO END YOUR LIFE?: NO
2. HAVE YOU ACTUALLY HAD ANY THOUGHTS OF KILLING YOURSELF?: NO

## 2024-05-14 ASSESSMENT — PAIN DESCRIPTION - PROGRESSION: CLINICAL_PROGRESSION: NOT CHANGED

## 2024-05-14 ASSESSMENT — LIFESTYLE VARIABLES
EVER FELT BAD OR GUILTY ABOUT YOUR DRINKING: NO
HAVE YOU EVER FELT YOU SHOULD CUT DOWN ON YOUR DRINKING: NO
HAVE PEOPLE ANNOYED YOU BY CRITICIZING YOUR DRINKING: NO
EVER HAD A DRINK FIRST THING IN THE MORNING TO STEADY YOUR NERVES TO GET RID OF A HANGOVER: NO
TOTAL SCORE: 0

## 2024-05-14 ASSESSMENT — PAIN - FUNCTIONAL ASSESSMENT: PAIN_FUNCTIONAL_ASSESSMENT: 0-10

## 2024-05-14 ASSESSMENT — PAIN SCALES - GENERAL: PAINLEVEL_OUTOF10: 5 - MODERATE PAIN

## 2024-05-14 ASSESSMENT — PAIN DESCRIPTION - PAIN TYPE: TYPE: ACUTE PAIN

## 2024-05-14 ASSESSMENT — PAIN DESCRIPTION - ONSET: ONSET: ONGOING

## 2024-05-14 ASSESSMENT — PAIN DESCRIPTION - ORIENTATION: ORIENTATION: LEFT

## 2024-05-14 ASSESSMENT — PAIN DESCRIPTION - DESCRIPTORS: DESCRIPTORS: BURNING

## 2024-05-14 ASSESSMENT — PAIN DESCRIPTION - LOCATION: LOCATION: LEG

## 2024-05-14 ASSESSMENT — PAIN DESCRIPTION - FREQUENCY: FREQUENCY: CONSTANT/CONTINUOUS

## 2024-05-14 NOTE — ED NOTES
Pt presents to ED with left ankle calf warmth and swelling and bruising. She stats she noticd it yesterday. She called her dr today and they told her to come in. She denies any chest pain and no history of clothes before. Pt was able to ambulate in     Hailey Valero RN  05/14/24 4224

## 2024-05-15 NOTE — ED PROVIDER NOTES
HPI   Chief Complaint   Patient presents with    Leg Swelling     Left ankle and calf swelling, left leg burning       73-year-old female with no significant past medical history brought to the ER due to concern for left lower extremity pain and swelling.  Patient states that she began have some burning pain in her left lower extremity near her calf and ankle area.  She did report some swelling in the area 2.  She was concerned about a blood clot.  She denies any shortness breath, chest pain, nausea, vomiting, fevers, chills.  She denies any right lower extremity swelling or edema.      History provided by:  Patient   used: No                        Tori Coma Scale Score: 15                     Patient History   Past Medical History:   Diagnosis Date    Abdominal distension (gaseous) 02/17/2020    Abdominal bloating    Abnormal results of liver function studies     Abnormal liver function    Acute upper respiratory infection, unspecified 03/17/2020    Viral URI with cough    Cellulitis of right lower limb 07/24/2020    Cellulitis of knee, right    Elevated blood-pressure reading, without diagnosis of hypertension 06/03/2021    Elevated blood pressure reading    Essential (primary) hypertension 06/17/2016    Benign essential hypertension    Flatulence 01/29/2020    Flatulence    Influenza due to other identified influenza virus with other respiratory manifestations 03/17/2020    Influenza B    Other conditions influencing health status     Normal colonoscopy    Other general symptoms and signs 06/03/2021    Flu-like symptoms    Other specified anxiety disorders 06/03/2021    Depression with anxiety    Personal history of diseases of the blood and blood-forming organs and certain disorders involving the immune mechanism 12/20/2019    History of anemia    Personal history of other diseases of the digestive system 11/11/2013    History of irritable bowel syndrome    Personal history of other  diseases of the digestive system 2021    History of hematemesis    Personal history of other diseases of urinary system 2021    History of hematuria    Personal history of other medical treatment 2017    History of screening mammography    Personal history of other medical treatment     H/O mammogram    Personal history of other specified conditions 2019    History of abdominal pain    Personal history of other specified conditions 2021    History of heartburn    Personal history of other specified conditions 2021    History of nausea and vomiting    Plantar fascial fibromatosis 2019    Plantar fasciitis, left    Right upper quadrant pain 2018    RUQ pain    Unspecified injury of unspecified wrist, hand and finger(s), initial encounter 2021    Wrist injury     Past Surgical History:   Procedure Laterality Date    BACK SURGERY  2018    Back Surgery    COLONOSCOPY  2018    Complete Colonoscopy    MR HEAD ANGIO WO IV CONTRAST  2016    MR HEAD ANGIO WO IV CONTRAST 2016 [facility]    OTHER SURGICAL HISTORY  2021    Colonoscopy    TUBAL LIGATION  2014    Tubal Ligation     Family History   Problem Relation Name Age of Onset    Depression Mother      Alzheimer's disease Mother      Other (cardiac arrhythmia) Mother      Hypertension Father      Depression Brother      Other (Other) Brother       Social History     Tobacco Use    Smoking status: Former     Current packs/day: 0.00     Average packs/day: 0.3 packs/day for 52.0 years (13.0 ttl pk-yrs)     Types: Cigarettes     Start date:      Quit date:      Years since quittin.3    Smokeless tobacco: Never   Substance Use Topics    Alcohol use: Yes     Alcohol/week: 14.0 standard drinks of alcohol     Types: 14 Shots of liquor per week    Drug use: Never       Physical Exam   ED Triage Vitals [24 1711]   Temperature Heart Rate Respirations BP   36.5 °C (97.7  °F) 73 16 156/77      Pulse Ox Temp Source Heart Rate Source Patient Position   96 % Temporal Monitor Sitting      BP Location FiO2 (%)     Right arm --       Physical Exam  Vitals and nursing note reviewed.   HENT:      Head: Normocephalic.      Right Ear: External ear normal.      Left Ear: External ear normal.      Nose: Nose normal.      Mouth/Throat:      Mouth: Mucous membranes are moist.   Eyes:      Extraocular Movements: Extraocular movements intact.      Conjunctiva/sclera: Conjunctivae normal.      Pupils: Pupils are equal, round, and reactive to light.   Cardiovascular:      Rate and Rhythm: Normal rate and regular rhythm.      Comments: DP pulse +2 bilaterally  Pulmonary:      Effort: Pulmonary effort is normal.      Breath sounds: No wheezing or rhonchi.   Abdominal:      General: Abdomen is flat. There is no distension.      Tenderness: There is no abdominal tenderness.   Musculoskeletal:         General: No signs of injury.   Skin:     General: Skin is warm.      Capillary Refill: Capillary refill takes less than 2 seconds.   Neurological:      General: No focal deficit present.      Mental Status: She is alert. Mental status is at baseline.   Psychiatric:         Mood and Affect: Mood normal.         ED Course & MDM   ED Course as of 05/16/24 1255   Tue May 14, 2024   2106 73-year-old female brought in the ER due to concern for leg swelling and pain.  On arrival she was in no acute distress.  Vitals are stable.  No DVT seen on ultrasound imaging.  Patient is neurovascularly intact, no concern for any arterial pathology.  Difficult will discharge patient home with instruction to follow-up with her primary care provider.  Strict return precautions given.  Patient was understanding and agreeable with plan for discharge. [MJ]      ED Course User Index  [MJ] Brian Abbasi DO         Diagnoses as of 05/16/24 1255   Pain of left lower extremity       Medical Decision  Making      Procedure  Procedures     Brian Abbasi, DO  Resident  05/16/24 1614

## 2024-05-15 NOTE — DISCHARGE INSTRUCTIONS
Follow-up with your primary care provider to discuss your ER visit.  Use Tylenol and Motrin as needed for pain.  If you develop any shortness of breath, lethargy, chest pain, or if you have any other concerns, please return to the ER for further care.

## 2024-05-16 PROBLEM — M18.11 PRIMARY OSTEOARTHRITIS OF FIRST CARPOMETACARPAL JOINT OF RIGHT HAND: Status: ACTIVE | Noted: 2023-10-20

## 2024-05-18 ENCOUNTER — OFFICE VISIT (OUTPATIENT)
Dept: PRIMARY CARE | Facility: CLINIC | Age: 74
End: 2024-05-18
Payer: MEDICARE

## 2024-05-18 VITALS
DIASTOLIC BLOOD PRESSURE: 74 MMHG | WEIGHT: 131 LBS | BODY MASS INDEX: 27.38 KG/M2 | SYSTOLIC BLOOD PRESSURE: 122 MMHG | TEMPERATURE: 96.9 F

## 2024-05-18 DIAGNOSIS — M79.89 LEFT LEG SWELLING: Primary | ICD-10-CM

## 2024-05-18 PROCEDURE — 1157F ADVNC CARE PLAN IN RCRD: CPT | Performed by: FAMILY MEDICINE

## 2024-05-18 PROCEDURE — 1036F TOBACCO NON-USER: CPT | Performed by: FAMILY MEDICINE

## 2024-05-18 PROCEDURE — 1123F ACP DISCUSS/DSCN MKR DOCD: CPT | Performed by: FAMILY MEDICINE

## 2024-05-18 PROCEDURE — 1160F RVW MEDS BY RX/DR IN RCRD: CPT | Performed by: FAMILY MEDICINE

## 2024-05-18 PROCEDURE — 99213 OFFICE O/P EST LOW 20 MIN: CPT | Performed by: FAMILY MEDICINE

## 2024-05-18 PROCEDURE — 1159F MED LIST DOCD IN RCRD: CPT | Performed by: FAMILY MEDICINE

## 2024-05-18 NOTE — PROGRESS NOTES
Chief complaint:   Chief Complaint   Patient presents with    Joint Swelling     Left ankle swelling 1 week- went to [facility] ER on 5/14/24       HPI:  Munira Bowen is a 73 y.o. female who presents for evaluation of left ankle swelling which increased to the calf and warm to touch for 1 week. She went to the ED 5/14/2024 and had US for DVT which was negative. The calf has been warm without redness. She has varicose veins which are worse on the left and rather newly popped out. Swelling is worse when she is on her feet or sitting prolonged periods of time. It improves overnight and is worse at the end of the day. Her mom had varicose veins.     She denies any abdominal pain, lumps, changes in BM, chest pain, SOB, or cough.     Physical exam:  /74   Temp 36.1 °C (96.9 °F)   Wt 59.4 kg (131 lb)   BMI 27.38 kg/m²   General: NAD, well appearing female  Heart: RRR, no mumur appreciated  Lungs: CTAB, no wheezes, rales, rhonchi  Abdomen: soft, non tender, normoactive BS, no organomegaly  Extremities: mild edema left ankle, varicose veins left calf protruding, present on the right calf not protruding, no redness, warmth, or bruising noted    Assessment/Plan   Problem List Items Addressed This Visit    None  Visit Diagnoses       Left leg swelling    -  Primary    Relevant Orders    Compression Stockings 18-30 mmHg        Trial compression stocking and discussed pattern of sx. Discussed possibility to further imaging to assess pending how she responds to the compression stocking. Follow up with PCP is scheduled for August.     Radha Avina DO

## 2024-07-15 ENCOUNTER — TELEPHONE (OUTPATIENT)
Dept: PRIMARY CARE | Facility: CLINIC | Age: 74
End: 2024-07-15
Payer: MEDICARE

## 2024-07-15 DIAGNOSIS — G47.00 INSOMNIA, UNSPECIFIED TYPE: ICD-10-CM

## 2024-07-15 DIAGNOSIS — F43.22 ADJUSTMENT DISORDER WITH ANXIETY: ICD-10-CM

## 2024-07-15 DIAGNOSIS — E78.5 HYPERLIPIDEMIA, UNSPECIFIED HYPERLIPIDEMIA TYPE: ICD-10-CM

## 2024-07-15 DIAGNOSIS — M10.9 GOUT, UNSPECIFIED CAUSE, UNSPECIFIED CHRONICITY, UNSPECIFIED SITE: ICD-10-CM

## 2024-07-15 RX ORDER — CITALOPRAM 20 MG/1
20 TABLET, FILM COATED ORAL DAILY
Qty: 90 TABLET | Refills: 1 | Status: SHIPPED | OUTPATIENT
Start: 2024-07-15 | End: 2025-01-11

## 2024-07-15 RX ORDER — TRAZODONE HYDROCHLORIDE 50 MG/1
TABLET ORAL
Qty: 90 TABLET | Refills: 0 | Status: SHIPPED | OUTPATIENT
Start: 2024-07-15

## 2024-07-15 RX ORDER — ATORVASTATIN CALCIUM 40 MG/1
40 TABLET, FILM COATED ORAL DAILY
Qty: 90 TABLET | Refills: 1 | Status: SHIPPED | OUTPATIENT
Start: 2024-07-15 | End: 2025-01-11

## 2024-07-15 RX ORDER — ALLOPURINOL 100 MG/1
100 TABLET ORAL DAILY
Qty: 90 TABLET | Refills: 1 | Status: SHIPPED | OUTPATIENT
Start: 2024-07-15 | End: 2025-01-11

## 2024-07-15 NOTE — TELEPHONE ENCOUNTER
REFILL REQUEST    1) Citalopram (20 mg - one tab daily)  2) Atorvastatin (40 mg - one tab daily)  3) Allopurinol (100 mg - one tab daily)  4) Trazodone (50 mg - 0.5-1 tab as needed)    Pharmacy: [facility]  Pharmacy Address: [id number] [facility]    LR: 02/23/2024  LV: 02/23/2024  NV: 08/22/2024

## 2024-08-22 ENCOUNTER — APPOINTMENT (OUTPATIENT)
Dept: PRIMARY CARE | Facility: CLINIC | Age: 74
End: 2024-08-22
Payer: MEDICARE

## 2024-08-22 PROBLEM — J06.9 URI (UPPER RESPIRATORY INFECTION): Status: RESOLVED | Noted: 2024-02-23 | Resolved: 2024-08-22

## 2024-08-23 ENCOUNTER — APPOINTMENT (OUTPATIENT)
Dept: PRIMARY CARE | Facility: CLINIC | Age: 74
End: 2024-08-23
Payer: MEDICARE

## 2024-08-23 VITALS
SYSTOLIC BLOOD PRESSURE: 130 MMHG | DIASTOLIC BLOOD PRESSURE: 83 MMHG | TEMPERATURE: 98.2 F | WEIGHT: 129.41 LBS | BODY MASS INDEX: 27.05 KG/M2

## 2024-08-23 DIAGNOSIS — Z12.31 ENCOUNTER FOR SCREENING MAMMOGRAM FOR MALIGNANT NEOPLASM OF BREAST: ICD-10-CM

## 2024-08-23 DIAGNOSIS — G47.33 OSA ON CPAP: ICD-10-CM

## 2024-08-23 DIAGNOSIS — H91.93 BILATERAL HEARING LOSS, UNSPECIFIED HEARING LOSS TYPE: ICD-10-CM

## 2024-08-23 DIAGNOSIS — F43.22 ADJUSTMENT DISORDER WITH ANXIETY: ICD-10-CM

## 2024-08-23 DIAGNOSIS — G47.00 INSOMNIA, UNSPECIFIED TYPE: ICD-10-CM

## 2024-08-23 DIAGNOSIS — E78.5 HYPERLIPIDEMIA, UNSPECIFIED HYPERLIPIDEMIA TYPE: Primary | ICD-10-CM

## 2024-08-23 DIAGNOSIS — Z23 ENCOUNTER FOR IMMUNIZATION: ICD-10-CM

## 2024-08-23 DIAGNOSIS — Z00.00 HEALTH CARE MAINTENANCE: ICD-10-CM

## 2024-08-23 LAB
CHOLEST SERPL-MCNC: 125 MG/DL (ref 0–199)
CHOLESTEROL/HDL RATIO: 2.1
GLUCOSE SERPL-MCNC: 97 MG/DL (ref 74–99)
HDLC SERPL-MCNC: 60.7 MG/DL
LDLC SERPL CALC-MCNC: 54 MG/DL
NON HDL CHOLESTEROL: 64 MG/DL (ref 0–149)
TRIGL SERPL-MCNC: 53 MG/DL (ref 0–149)
VLDL: 11 MG/DL (ref 0–40)

## 2024-08-23 PROCEDURE — 90662 IIV NO PRSV INCREASED AG IM: CPT | Performed by: FAMILY MEDICINE

## 2024-08-23 PROCEDURE — 99214 OFFICE O/P EST MOD 30 MIN: CPT | Performed by: FAMILY MEDICINE

## 2024-08-23 PROCEDURE — G0008 ADMIN INFLUENZA VIRUS VAC: HCPCS | Performed by: FAMILY MEDICINE

## 2024-08-23 PROCEDURE — 1159F MED LIST DOCD IN RCRD: CPT | Performed by: FAMILY MEDICINE

## 2024-08-23 PROCEDURE — 82947 ASSAY GLUCOSE BLOOD QUANT: CPT

## 2024-08-23 PROCEDURE — 1160F RVW MEDS BY RX/DR IN RCRD: CPT | Performed by: FAMILY MEDICINE

## 2024-08-23 PROCEDURE — 80061 LIPID PANEL: CPT

## 2024-08-23 PROCEDURE — 1123F ACP DISCUSS/DSCN MKR DOCD: CPT | Performed by: FAMILY MEDICINE

## 2024-08-23 PROCEDURE — 1157F ADVNC CARE PLAN IN RCRD: CPT | Performed by: FAMILY MEDICINE

## 2024-08-23 NOTE — PROGRESS NOTES
Subjective   Patient ID: [id number]     Munira Bowen is a 73 y.o. female who presents for Med Management.    HPI  Taking meds as directed without tolerability or affordability issues; no complaints today.    Review of Systems  GEN-denies, fever, weakness or myalgias; no unexplained fever or chills  OPTH-No dry eyes, , or blurry vision;  using drops for  itchy eyes with good relief  ENT-No hearing loss, tinnitus or vertigo  NECK-no stiffness, swelling or pain  PSYCH-No complaints regarding libido, appetite, memory or concentration;  no drug use or alcohol usage over six per week  SLEEP-No complaints of insomnia, apnea (feels CPAP is awkward) or restless legs;  patient is waking up rested  ALL/IMMUN-No history of sneezing or itching  HEM-No unexplained bruising or bleeding    Objective     /83 (BP Location: Left arm, Patient Position: Sitting)   Temp 36.8 °C (98.2 °F) (Oral)   Wt 58.7 kg (129 lb 6.6 oz)   BMI 27.05 kg/m²      Physical Exam  General- well defined, well nourished, well hydrated individual in NAD  Skin- normal color and turgor; without nail pitting  Head-normocephalic without masses or tenderness  Eyes-pupils equal round, reactive to light and accommodation, fundi with normal cup/disc ratio, conjunctiva without redness or discharge  Ears-normal pinnae, and canals, with normal landmarks and light reflex of tympanic membranes bilaterally  Nose-septum in the midline, normal mucosa bilaterally  Throat- without erythema or exudate, uvula in midline  Neck-supple without lymphadenopathy or thyromegaly; no carotid bruits  Heart- regular rate and rhythm, normal s1 and s2 without murmur or gallop; peripheral pulses 2+ and symmetrical  Lungs-clear to auscultation  Abdomen-soft, positive bowel sounds, without masses, HSmegaly or pain     Assessment/Plan     Problem List Items Addressed This Visit       Adjustment disorder with anxiety    Hyperlipidemia - Primary    Relevant Orders    Lipid Panel     Glucose    Insomnia    ALCIDES on CPAP    Health care maintenance    Relevant Orders    BI mammo bilateral screening tomosynthesis    Bilateral hearing loss     Bilateral hearing aids          Other Visit Diagnoses       Encounter for screening mammogram for malignant neoplasm of breast        Relevant Orders    BI mammo bilateral screening tomosynthesis    Encounter for immunization        Relevant Orders    Flu vaccine, trivalent, preservative free, age 6 months and greater (Fluraix/Fluzone/Flulaval)          Please get your mammogram.    Follow up fasting (no alcohol for 48 hours and just water for 14 hours) in six months for your next routine appointment.  In general, take any medications on schedule (except for types of Insulin).      Smith Avina MD

## 2024-09-17 ENCOUNTER — OFFICE VISIT (OUTPATIENT)
Dept: PRIMARY CARE | Facility: CLINIC | Age: 74
End: 2024-09-17
Payer: MEDICARE

## 2024-09-17 VITALS
TEMPERATURE: 96.9 F | BODY MASS INDEX: 27.17 KG/M2 | DIASTOLIC BLOOD PRESSURE: 66 MMHG | SYSTOLIC BLOOD PRESSURE: 120 MMHG | WEIGHT: 130 LBS

## 2024-09-17 DIAGNOSIS — J32.9 SINUSITIS, UNSPECIFIED CHRONICITY, UNSPECIFIED LOCATION: Primary | ICD-10-CM

## 2024-09-17 DIAGNOSIS — J30.9 ALLERGIC SINUSITIS: ICD-10-CM

## 2024-09-17 PROCEDURE — 1157F ADVNC CARE PLAN IN RCRD: CPT | Performed by: FAMILY MEDICINE

## 2024-09-17 PROCEDURE — 1123F ACP DISCUSS/DSCN MKR DOCD: CPT | Performed by: FAMILY MEDICINE

## 2024-09-17 PROCEDURE — 99213 OFFICE O/P EST LOW 20 MIN: CPT | Performed by: FAMILY MEDICINE

## 2024-09-17 RX ORDER — PREDNISONE 20 MG/1
40 TABLET ORAL DAILY
Qty: 10 TABLET | Refills: 0 | Status: SHIPPED | OUTPATIENT
Start: 2024-09-17 | End: 2024-09-22

## 2024-09-17 NOTE — PROGRESS NOTES
Chief complaint:   Chief Complaint   Patient presents with    Right eye swelling     2 weeks    Nasal Congestion     Runny nose, congestion, 2 weeks    Sore Throat     2 weeks    bilateral ear pain     2 weeks       HPI:  Munira Bowen is a 73 y.o. female who presents for evaluation of 2 weeks of sore throat, congestion, bilateral ear pain. She states she has right eye swelling which comes and goes. She was seen in urgent care and prescribed erythromycin ointment.     She has a hx of right eye swelling which happens when she has allergies and has had these sx for 20 years.     Physical exam:  /66   Temp 36.1 °C (96.9 °F)   Wt 59 kg (130 lb)   BMI 27.17 kg/m²   General: NAD, well appearing female  Eye: right eyelid swelling, no erythema to the sclera, no surrounding erythema, PERRLA, EOM intact  Ears: TM normal bilaterally  Nose: moist  Mouth: moist  Heart: RRR, no mumur appreciated  Lungs: CTAB, no wheezes, rales, rhonchi    Assessment/Plan   Problem List Items Addressed This Visit    None  Visit Diagnoses       Sinusitis, unspecified chronicity, unspecified location    -  Primary    Allergic sinusitis            Prednisone 40 mg PO daily x 5 days was initiated  Follow up within 2-3 days if not improving, and within 5 days if not resolved, sooner if sx change or worsen    Radha Avina,

## 2024-10-02 DIAGNOSIS — E78.5 HYPERLIPIDEMIA, UNSPECIFIED HYPERLIPIDEMIA TYPE: ICD-10-CM

## 2024-10-02 RX ORDER — ATORVASTATIN CALCIUM 40 MG/1
40 TABLET, FILM COATED ORAL DAILY
Qty: 90 TABLET | Refills: 1 | OUTPATIENT
Start: 2024-10-02 | End: 2025-03-31

## 2024-10-10 DIAGNOSIS — M10.9 GOUT, UNSPECIFIED CAUSE, UNSPECIFIED CHRONICITY, UNSPECIFIED SITE: ICD-10-CM

## 2024-10-10 RX ORDER — ALLOPURINOL 100 MG/1
100 TABLET ORAL DAILY
Qty: 90 TABLET | Refills: 1 | OUTPATIENT
Start: 2024-10-10 | End: 2025-04-08

## 2024-11-14 ENCOUNTER — APPOINTMENT (OUTPATIENT)
Dept: PRIMARY CARE | Facility: CLINIC | Age: 74
End: 2024-11-14
Payer: MEDICARE

## 2024-11-14 VITALS
DIASTOLIC BLOOD PRESSURE: 87 MMHG | SYSTOLIC BLOOD PRESSURE: 144 MMHG | TEMPERATURE: 98.3 F | BODY MASS INDEX: 26.62 KG/M2 | HEIGHT: 59 IN | WEIGHT: 132.06 LBS

## 2024-11-14 DIAGNOSIS — F43.22 ADJUSTMENT DISORDER WITH ANXIETY: ICD-10-CM

## 2024-11-14 DIAGNOSIS — E78.5 HYPERLIPIDEMIA, UNSPECIFIED HYPERLIPIDEMIA TYPE: ICD-10-CM

## 2024-11-14 DIAGNOSIS — M10.9 GOUT, UNSPECIFIED CAUSE, UNSPECIFIED CHRONICITY, UNSPECIFIED SITE: ICD-10-CM

## 2024-11-14 DIAGNOSIS — H91.93 BILATERAL HEARING LOSS, UNSPECIFIED HEARING LOSS TYPE: ICD-10-CM

## 2024-11-14 DIAGNOSIS — G47.00 INSOMNIA, UNSPECIFIED TYPE: ICD-10-CM

## 2024-11-14 DIAGNOSIS — N18.2 CKD (CHRONIC KIDNEY DISEASE) STAGE 2, GFR 60-89 ML/MIN: Primary | ICD-10-CM

## 2024-11-14 PROCEDURE — G0442 ANNUAL ALCOHOL SCREEN 15 MIN: HCPCS | Performed by: FAMILY MEDICINE

## 2024-11-14 PROCEDURE — 99214 OFFICE O/P EST MOD 30 MIN: CPT | Performed by: FAMILY MEDICINE

## 2024-11-14 PROCEDURE — 1170F FXNL STATUS ASSESSED: CPT | Performed by: FAMILY MEDICINE

## 2024-11-14 PROCEDURE — G0439 PPPS, SUBSEQ VISIT: HCPCS | Performed by: FAMILY MEDICINE

## 2024-11-14 PROCEDURE — 1160F RVW MEDS BY RX/DR IN RCRD: CPT | Performed by: FAMILY MEDICINE

## 2024-11-14 PROCEDURE — G0444 DEPRESSION SCREEN ANNUAL: HCPCS | Performed by: FAMILY MEDICINE

## 2024-11-14 PROCEDURE — 1159F MED LIST DOCD IN RCRD: CPT | Performed by: FAMILY MEDICINE

## 2024-11-14 PROCEDURE — 3008F BODY MASS INDEX DOCD: CPT | Performed by: FAMILY MEDICINE

## 2024-11-14 PROCEDURE — 1123F ACP DISCUSS/DSCN MKR DOCD: CPT | Performed by: FAMILY MEDICINE

## 2024-11-14 PROCEDURE — 1157F ADVNC CARE PLAN IN RCRD: CPT | Performed by: FAMILY MEDICINE

## 2024-11-14 RX ORDER — TRAZODONE HYDROCHLORIDE 50 MG/1
TABLET ORAL
Qty: 90 TABLET | Refills: 0 | Status: SHIPPED | OUTPATIENT
Start: 2024-11-14

## 2024-11-14 RX ORDER — ATORVASTATIN CALCIUM 40 MG/1
40 TABLET, FILM COATED ORAL DAILY
Qty: 90 TABLET | Refills: 0 | Status: SHIPPED | OUTPATIENT
Start: 2024-11-14

## 2024-11-14 RX ORDER — CITALOPRAM 20 MG/1
20 TABLET, FILM COATED ORAL DAILY
Qty: 90 TABLET | Refills: 0 | Status: SHIPPED | OUTPATIENT
Start: 2024-11-14

## 2024-11-14 RX ORDER — ALLOPURINOL 100 MG/1
100 TABLET ORAL DAILY
Qty: 90 TABLET | Refills: 0 | Status: SHIPPED | OUTPATIENT
Start: 2024-11-14

## 2024-11-14 ASSESSMENT — ACTIVITIES OF DAILY LIVING (ADL)
GROCERY_SHOPPING: INDEPENDENT
BATHING: INDEPENDENT
MANAGING_FINANCES: INDEPENDENT
TAKING_MEDICATION: INDEPENDENT
DOING_HOUSEWORK: INDEPENDENT
DRESSING: INDEPENDENT

## 2024-11-14 ASSESSMENT — PATIENT HEALTH QUESTIONNAIRE - PHQ9
2. FEELING DOWN, DEPRESSED OR HOPELESS: NOT AT ALL
1. LITTLE INTEREST OR PLEASURE IN DOING THINGS: NOT AT ALL
SUM OF ALL RESPONSES TO PHQ9 QUESTIONS 1 AND 2: 0

## 2024-11-14 ASSESSMENT — ENCOUNTER SYMPTOMS
OCCASIONAL FEELINGS OF UNSTEADINESS: 0
LOSS OF SENSATION IN FEET: 0
DEPRESSION: 0

## 2024-11-14 NOTE — PROGRESS NOTES
Subjective   Reason for Visit: Munira Bowen is an 74 y.o. female here for a Medicare Wellness visit.     Past Medical, Surgical, and Family History reviewed and updated in chart.  In the past 2 weeks this patient has not felt down, depressed, hopeless, lost interest or pleasure in doing things or thought of harming herself or others and this was discussed over five minutes. The patient has not fallen in the last six months and has no loose rugs,  uneven floors or poor lighting at home.  Advance Care Planning discussion was held over 5 minutes.  The patient has no spiritual/Mu-ism/cultural values or needs that we need to know. The patient does not feel threatened or abused physically, emotionally or sexually.  The patient feels safe in the home.  In the past month, there was not a day when the patient of anyone in the patient's family went hungry because there was not enough food.  The patient denies that they or the person with them has problems with hearing, speaking, reading, moving around or learning.  The patient states they are comfortable filling out medical forms. Alcohol usage was dicussed over five minutes.  Reviewed all medications by prescribing practitioner or clinical pharmacist (such as prescriptions, OTCs, herbal therapies and supplements) and documented in the medical record.  HPI  Taking meds as directed without tolerability or affordability issues; no complaints today.  Sleeping is interrupted 10 % of the time;  she is now using her sleep apnea machine    Patient Care Team:  Smith Avina MD as PCP - General  Smith Avina MD as PCP - [facility] [place] [place]     Review of Systems  General-denies weakness or myalgias; no unexplained fever or chills  Opth-no dry eyes, itchy eyes or blurry vision  ENT-No tinnitus or vertigo; hearing loss well mitigated by hearing aids  Neck-no unexplained swelling in neck or pain    Objective   Vitals:  /87 (BP Location: Left arm, Patient  "Position: Sitting)   Temp 36.8 °C (98.3 °F) (Oral)   Ht 1.499 m (4' 11\")   Wt 59.9 kg (132 lb 0.9 oz)   BMI 26.67 kg/m²       Physical Exam  General- well defined, well nourished, well hydrated individual in NAD  Head-normocephalic without masses or tenderness  Eyes-pupils equal round, reactive to light and accommodation, fundi with normal cup/disc ratio, conjunctiva without redness or discharge  Ears-normal pinnae, and canals, with normal landmarks and light reflex of tympanic membranes bilaterally  Nose-septum in the midline, normal mucosa bilaterally  Throat- without erythema or exudate, uvula in midline  Neck-supple without lymphadenopathy or thyromegaly; no carotid bruits  Heart- regular rate and rhythm, normal s1 and s2 without murmur or gallop;  no edema  Lungs-clear to auscultation  Abdomen-soft, positive bowel sounds, without masses, HSmegaly or pain     Assessment/Plan   Problem List Items Addressed This Visit       Adjustment disorder with anxiety    Relevant Medications    citalopram (CeleXA) 20 mg tablet    CKD (chronic kidney disease) stage 2, GFR 60-89 ml/min - Primary    Relevant Orders    POCT Albumin random urine manually resulted    Gout    Relevant Medications    allopurinol (Zyloprim) 100 mg tablet    Hyperlipidemia    Relevant Medications    atorvastatin (Lipitor) 40 mg tablet    Insomnia    Relevant Medications    traZODone (Desyrel) 50 mg tablet    Bilateral hearing loss    Current Assessment & Plan     Bilateral hearing aids                 Please schedule your mammogram    You are eligible for the COVID booster.  Without a recent (within 90 days) challenge (booster or infection) your immune system will be three days behind in protecting you from the infection.  You will infect approximately five people by that time.    Follow up in three months for your next routine appointment.         "

## 2024-11-14 NOTE — PATIENT INSTRUCTIONS
Please schedule your mammogram    You are eligible for the COVID booster.  Without a recent (within 90 days) challenge (booster or infection) your immune system will be three days behind in protecting you from the infection.  You will infect approximately five people by that time.    Follow up in three months for your next routine appointment.

## 2025-02-07 ENCOUNTER — PATIENT MESSAGE (OUTPATIENT)
Dept: PRIMARY CARE | Facility: CLINIC | Age: 75
End: 2025-02-07
Payer: MEDICARE

## 2025-02-07 DIAGNOSIS — F43.22 ADJUSTMENT DISORDER WITH ANXIETY: ICD-10-CM

## 2025-02-07 DIAGNOSIS — M10.9 GOUT, UNSPECIFIED CAUSE, UNSPECIFIED CHRONICITY, UNSPECIFIED SITE: ICD-10-CM

## 2025-02-07 DIAGNOSIS — G47.00 INSOMNIA, UNSPECIFIED TYPE: ICD-10-CM

## 2025-02-07 DIAGNOSIS — E78.5 HYPERLIPIDEMIA, UNSPECIFIED HYPERLIPIDEMIA TYPE: ICD-10-CM

## 2025-02-07 RX ORDER — ATORVASTATIN CALCIUM 40 MG/1
40 TABLET, FILM COATED ORAL DAILY
Qty: 90 TABLET | Refills: 0 | Status: SHIPPED | OUTPATIENT
Start: 2025-02-07

## 2025-02-07 RX ORDER — ALLOPURINOL 100 MG/1
100 TABLET ORAL DAILY
Qty: 90 TABLET | Refills: 0 | Status: SHIPPED | OUTPATIENT
Start: 2025-02-07

## 2025-02-07 RX ORDER — TRAZODONE HYDROCHLORIDE 50 MG/1
TABLET ORAL
Qty: 90 TABLET | Refills: 0 | Status: SHIPPED | OUTPATIENT
Start: 2025-02-07

## 2025-02-07 RX ORDER — CITALOPRAM 20 MG/1
20 TABLET, FILM COATED ORAL DAILY
Qty: 90 TABLET | Refills: 0 | Status: SHIPPED | OUTPATIENT
Start: 2025-02-07

## 2025-02-12 ENCOUNTER — PATIENT MESSAGE (OUTPATIENT)
Dept: PRIMARY CARE | Facility: CLINIC | Age: 75
End: 2025-02-12
Payer: MEDICARE

## 2025-02-12 DIAGNOSIS — F43.22 ADJUSTMENT DISORDER WITH ANXIETY: ICD-10-CM

## 2025-02-12 DIAGNOSIS — G47.33 OSA ON CPAP: Primary | ICD-10-CM

## 2025-02-12 DIAGNOSIS — G47.00 INSOMNIA, UNSPECIFIED TYPE: ICD-10-CM

## 2025-02-12 DIAGNOSIS — M10.9 GOUT, UNSPECIFIED CAUSE, UNSPECIFIED CHRONICITY, UNSPECIFIED SITE: ICD-10-CM

## 2025-02-12 DIAGNOSIS — E78.5 HYPERLIPIDEMIA, UNSPECIFIED HYPERLIPIDEMIA TYPE: ICD-10-CM

## 2025-02-12 RX ORDER — ALLOPURINOL 100 MG/1
100 TABLET ORAL DAILY
Qty: 90 TABLET | Refills: 0 | Status: SHIPPED | OUTPATIENT
Start: 2025-02-12

## 2025-02-12 RX ORDER — CITALOPRAM 20 MG/1
20 TABLET, FILM COATED ORAL DAILY
Qty: 90 TABLET | Refills: 0 | Status: SHIPPED | OUTPATIENT
Start: 2025-02-12

## 2025-02-12 RX ORDER — ATORVASTATIN CALCIUM 40 MG/1
40 TABLET, FILM COATED ORAL DAILY
Qty: 90 TABLET | Refills: 0 | Status: SHIPPED | OUTPATIENT
Start: 2025-02-12

## 2025-02-12 RX ORDER — TRAZODONE HYDROCHLORIDE 50 MG/1
TABLET ORAL
Qty: 90 TABLET | Refills: 0 | Status: SHIPPED | OUTPATIENT
Start: 2025-02-12

## 2025-02-17 ENCOUNTER — APPOINTMENT (OUTPATIENT)
Dept: PRIMARY CARE | Facility: CLINIC | Age: 75
End: 2025-02-17
Payer: MEDICARE

## 2025-02-17 VITALS
SYSTOLIC BLOOD PRESSURE: 122 MMHG | DIASTOLIC BLOOD PRESSURE: 83 MMHG | BODY MASS INDEX: 27.67 KG/M2 | TEMPERATURE: 98.4 F | WEIGHT: 137 LBS

## 2025-02-17 DIAGNOSIS — R35.0 URINARY FREQUENCY: ICD-10-CM

## 2025-02-17 DIAGNOSIS — G57.61 MORTON'S NEUROMA OF RIGHT FOOT: ICD-10-CM

## 2025-02-17 DIAGNOSIS — H91.93 BILATERAL HEARING LOSS, UNSPECIFIED HEARING LOSS TYPE: ICD-10-CM

## 2025-02-17 DIAGNOSIS — L30.9 ECZEMA, UNSPECIFIED TYPE: ICD-10-CM

## 2025-02-17 DIAGNOSIS — N18.2 CKD (CHRONIC KIDNEY DISEASE) STAGE 2, GFR 60-89 ML/MIN: ICD-10-CM

## 2025-02-17 DIAGNOSIS — M18.11 PRIMARY OSTEOARTHRITIS OF FIRST CARPOMETACARPAL JOINT OF RIGHT HAND: ICD-10-CM

## 2025-02-17 DIAGNOSIS — E78.5 HYPERLIPIDEMIA, UNSPECIFIED HYPERLIPIDEMIA TYPE: Primary | ICD-10-CM

## 2025-02-17 DIAGNOSIS — M10.9 GOUT, UNSPECIFIED CAUSE, UNSPECIFIED CHRONICITY, UNSPECIFIED SITE: ICD-10-CM

## 2025-02-17 LAB
POC APPEARANCE, URINE: CLEAR
POC BILIRUBIN, URINE: NEGATIVE
POC BLOOD, URINE: NEGATIVE
POC COLOR, URINE: YELLOW
POC GLUCOSE, URINE: NEGATIVE MG/DL
POC KETONES, URINE: NEGATIVE MG/DL
POC LEUKOCYTES, URINE: ABNORMAL
POC NITRITE,URINE: NEGATIVE
POC PH, URINE: 5 PH
POC PROTEIN, URINE: NEGATIVE MG/DL
POC SPECIFIC GRAVITY, URINE: 1.01
POC UROBILINOGEN, URINE: 0.2 EU/DL

## 2025-02-17 PROCEDURE — G2211 COMPLEX E/M VISIT ADD ON: HCPCS | Performed by: FAMILY MEDICINE

## 2025-02-17 PROCEDURE — 99214 OFFICE O/P EST MOD 30 MIN: CPT | Performed by: FAMILY MEDICINE

## 2025-02-17 PROCEDURE — 81003 URINALYSIS AUTO W/O SCOPE: CPT | Performed by: FAMILY MEDICINE

## 2025-02-17 PROCEDURE — 1159F MED LIST DOCD IN RCRD: CPT | Performed by: FAMILY MEDICINE

## 2025-02-17 PROCEDURE — 1123F ACP DISCUSS/DSCN MKR DOCD: CPT | Performed by: FAMILY MEDICINE

## 2025-02-17 PROCEDURE — 1157F ADVNC CARE PLAN IN RCRD: CPT | Performed by: FAMILY MEDICINE

## 2025-02-17 PROCEDURE — 1160F RVW MEDS BY RX/DR IN RCRD: CPT | Performed by: FAMILY MEDICINE

## 2025-02-17 NOTE — PROGRESS NOTES
Subjective   Patient ID: [id number]     Munira Bowen is a 74 y.o. female who presents for Med Management.    HPI  Taking meds as directed without tolerability or affordability issues; no complaints today.    Review of Systems  CARDIO- No chest pain or pressure, nausea, diaphoresis, paresthesias, dizziness, or syncope with or without exertion  GI-No blood in stool, tarry stools, pain, vomiting, heartburn, constipation or diarrhea  PULM-No wheezing, coughing or shortness of breath  UROL-No frequency, urgency, blood in urine, or incontinence; nocturia times five   ENDO- No change in hair, voice, skin, weight or temperature tolerance   MSK-No locking, giving way/swelling of joints;  minimal pain in hands after shoveling snow today  NEURO- No daily headaches, hx of concussion, fall or seizure in the last year   DERM-No rashes, blanching or change in any moles    Objective     /83 (BP Location: Left arm, Patient Position: Sitting)   Temp 36.9 °C (98.4 °F) (Oral)   Wt 62.1 kg (137 lb)   BMI 27.67 kg/m²      Physical Exam  Neck-supple without lymphadenopathy or thyromegaly; no carotid bruits  Heart- regular rate and rhythm, normal s1 and s2 without murmur or gallop; no peripheral edema  Lungs-clear to auscultation  Abdomen-soft, positive bowel sounds, without masses, HSmegaly or pain     Assessment/Plan     Problem List Items Addressed This Visit       CKD (chronic kidney disease) stage 2, GFR 60-89 ml/min    Relevant Orders    Albumin-Creatinine Ratio, Urine Random    Dermatitis, eczematoid    Gout    Relevant Orders    Uric Acid    Hyperlipidemia - Primary    Relevant Orders    Lipid Panel    Comprehensive Metabolic Panel    Thyroid Stimulating Hormone    Pham's neuroma of right foot    Urinary frequency    Relevant Orders    POCT UA Automated manually resulted    Bilateral hearing loss     Bilateral hearing aids         Primary osteoarthritis of first carpometacarpal joint of right hand     Follow up  fasting (no alcohol for 48 hours and just water for 14 hours) in six months for your next routine appointment.  In general, take any medications on schedule (except for types of Insulin).      Smith Avina MD

## 2025-02-18 DIAGNOSIS — E78.5 HYPERLIPIDEMIA, UNSPECIFIED HYPERLIPIDEMIA TYPE: Primary | ICD-10-CM

## 2025-02-18 DIAGNOSIS — R35.0 URINARY FREQUENCY: ICD-10-CM

## 2025-02-18 LAB
ALBUMIN SERPL-MCNC: 4.5 G/DL (ref 3.6–5.1)
ALP SERPL-CCNC: 72 U/L (ref 37–153)
ALT SERPL-CCNC: 24 U/L (ref 6–29)
ANION GAP SERPL CALCULATED.4IONS-SCNC: 9 MMOL/L (CALC) (ref 7–17)
AST SERPL-CCNC: 27 U/L (ref 10–35)
BILIRUB SERPL-MCNC: 0.5 MG/DL (ref 0.2–1.2)
BUN SERPL-MCNC: 21 MG/DL (ref 7–25)
CALCIUM SERPL-MCNC: 9.3 MG/DL (ref 8.6–10.4)
CHLORIDE SERPL-SCNC: 105 MMOL/L (ref 98–110)
CHOLEST SERPL-MCNC: 204 MG/DL
CHOLEST/HDLC SERPL: 2.6 (CALC)
CO2 SERPL-SCNC: 27 MMOL/L (ref 20–32)
CREAT SERPL-MCNC: 0.84 MG/DL (ref 0.6–1)
EGFRCR SERPLBLD CKD-EPI 2021: 73 ML/MIN/1.73M2
GLUCOSE SERPL-MCNC: 94 MG/DL (ref 65–99)
HDLC SERPL-MCNC: 78 MG/DL
LDLC SERPL CALC-MCNC: 105 MG/DL (CALC)
NONHDLC SERPL-MCNC: 126 MG/DL (CALC)
POTASSIUM SERPL-SCNC: 4.9 MMOL/L (ref 3.5–5.3)
PROT SERPL-MCNC: 6.8 G/DL (ref 6.1–8.1)
SODIUM SERPL-SCNC: 141 MMOL/L (ref 135–146)
TRIGL SERPL-MCNC: 110 MG/DL
TSH SERPL-ACNC: 2.04 MIU/L (ref 0.4–4.5)
URATE SERPL-MCNC: 6.4 MG/DL (ref 2.5–7)

## 2025-02-18 RX ORDER — ATORVASTATIN CALCIUM 80 MG/1
80 TABLET, FILM COATED ORAL DAILY
Qty: 90 TABLET | Refills: 1 | Status: SHIPPED | OUTPATIENT
Start: 2025-02-18 | End: 2025-08-17

## 2025-03-03 DIAGNOSIS — E78.5 HYPERLIPIDEMIA, UNSPECIFIED HYPERLIPIDEMIA TYPE: Primary | ICD-10-CM

## 2025-03-03 RX ORDER — ATORVASTATIN CALCIUM 40 MG/1
40 TABLET, FILM COATED ORAL DAILY
Qty: 90 TABLET | Refills: 1
Start: 2025-03-03 | End: 2025-08-30

## 2025-04-11 ENCOUNTER — APPOINTMENT (OUTPATIENT)
Dept: PRIMARY CARE | Facility: CLINIC | Age: 75
End: 2025-04-11
Payer: MEDICARE

## 2025-04-11 VITALS
BODY MASS INDEX: 26.76 KG/M2 | TEMPERATURE: 98.2 F | SYSTOLIC BLOOD PRESSURE: 136 MMHG | DIASTOLIC BLOOD PRESSURE: 69 MMHG | WEIGHT: 132.5 LBS

## 2025-04-11 DIAGNOSIS — F43.22 ADJUSTMENT DISORDER WITH ANXIETY: ICD-10-CM

## 2025-04-11 DIAGNOSIS — E78.5 HYPERLIPIDEMIA, UNSPECIFIED HYPERLIPIDEMIA TYPE: Primary | ICD-10-CM

## 2025-04-11 DIAGNOSIS — M10.9 GOUT, UNSPECIFIED CAUSE, UNSPECIFIED CHRONICITY, UNSPECIFIED SITE: ICD-10-CM

## 2025-04-11 PROCEDURE — 1123F ACP DISCUSS/DSCN MKR DOCD: CPT | Performed by: FAMILY MEDICINE

## 2025-04-11 PROCEDURE — 1159F MED LIST DOCD IN RCRD: CPT | Performed by: FAMILY MEDICINE

## 2025-04-11 PROCEDURE — 99213 OFFICE O/P EST LOW 20 MIN: CPT | Performed by: FAMILY MEDICINE

## 2025-04-11 PROCEDURE — 1157F ADVNC CARE PLAN IN RCRD: CPT | Performed by: FAMILY MEDICINE

## 2025-04-11 PROCEDURE — 1160F RVW MEDS BY RX/DR IN RCRD: CPT | Performed by: FAMILY MEDICINE

## 2025-04-11 NOTE — PROGRESS NOTES
Subjective   Patient ID: [id number]     Munira Bowen is a 74 y.o. female who presents for No chief complaint on file..    HPI  Taking increased dose of atorvastatin without difficulty (one and a half of the 40 mg atorvastatin that she decided on her own)    Review of Systems  GI-No vomiting, heartburn, constipation or diarrhea  MSK-No symmetrical muscle pain  DERM-No rashes    Objective     /69 (BP Location: Right arm, Patient Position: Sitting)   Temp 36.8 °C (98.2 °F) (Oral)   Wt 60.1 kg (132 lb 7.9 oz)   BMI 26.76 kg/m²      Physical Exam  Neck-supple without lymphadenopathy or thyromegaly; no carotid bruits  Heart- regular rate and rhythm, normal s1 and s2 without murmur or gallop; no edema  Lungs-clear to auscultation  Abdomen-soft, positive bowel sounds, without masses, HSmegaly or pain     Assessment/Plan     Problem List Items Addressed This Visit       Adjustment disorder with anxiety    Gout    Hyperlipidemia - Primary    Relevant Orders    Comprehensive Metabolic Panel    Lipid Panel       Follow up fasting (no alcohol for 48 hours and just water for 14 hours) in five months.    Smith Avina MD

## 2025-04-12 LAB
ALBUMIN SERPL-MCNC: 4.4 G/DL (ref 3.6–5.1)
ALP SERPL-CCNC: 69 U/L (ref 37–153)
ALT SERPL-CCNC: 18 U/L (ref 6–29)
ANION GAP SERPL CALCULATED.4IONS-SCNC: 9 MMOL/L (CALC) (ref 7–17)
AST SERPL-CCNC: 26 U/L (ref 10–35)
BILIRUB SERPL-MCNC: 0.8 MG/DL (ref 0.2–1.2)
BUN SERPL-MCNC: 17 MG/DL (ref 7–25)
CALCIUM SERPL-MCNC: 9.2 MG/DL (ref 8.6–10.4)
CHLORIDE SERPL-SCNC: 104 MMOL/L (ref 98–110)
CHOLEST SERPL-MCNC: 160 MG/DL
CHOLEST/HDLC SERPL: 2.4 (CALC)
CO2 SERPL-SCNC: 26 MMOL/L (ref 20–32)
CREAT SERPL-MCNC: 0.85 MG/DL (ref 0.6–1)
EGFRCR SERPLBLD CKD-EPI 2021: 72 ML/MIN/1.73M2
GLUCOSE SERPL-MCNC: 96 MG/DL (ref 65–99)
HDLC SERPL-MCNC: 67 MG/DL
LDLC SERPL CALC-MCNC: 75 MG/DL (CALC)
NONHDLC SERPL-MCNC: 93 MG/DL (CALC)
POTASSIUM SERPL-SCNC: 4.7 MMOL/L (ref 3.5–5.3)
PROT SERPL-MCNC: 6.6 G/DL (ref 6.1–8.1)
SODIUM SERPL-SCNC: 139 MMOL/L (ref 135–146)
TRIGL SERPL-MCNC: 96 MG/DL

## 2025-04-30 ENCOUNTER — OFFICE VISIT (OUTPATIENT)
Dept: URGENT CARE | Age: 75
End: 2025-04-30
Payer: MEDICARE

## 2025-04-30 VITALS
HEART RATE: 85 BPM | DIASTOLIC BLOOD PRESSURE: 67 MMHG | SYSTOLIC BLOOD PRESSURE: 119 MMHG | BODY MASS INDEX: 27.71 KG/M2 | RESPIRATION RATE: 16 BRPM | WEIGHT: 132 LBS | HEIGHT: 58 IN | OXYGEN SATURATION: 96 % | TEMPERATURE: 98.7 F

## 2025-04-30 DIAGNOSIS — H10.11 ALLERGIC CONJUNCTIVITIS OF RIGHT EYE: Primary | ICD-10-CM

## 2025-04-30 PROCEDURE — 1123F ACP DISCUSS/DSCN MKR DOCD: CPT | Performed by: FAMILY MEDICINE

## 2025-04-30 PROCEDURE — 99203 OFFICE O/P NEW LOW 30 MIN: CPT | Performed by: FAMILY MEDICINE

## 2025-04-30 PROCEDURE — 1036F TOBACCO NON-USER: CPT | Performed by: FAMILY MEDICINE

## 2025-04-30 PROCEDURE — 1157F ADVNC CARE PLAN IN RCRD: CPT | Performed by: FAMILY MEDICINE

## 2025-04-30 PROCEDURE — 3008F BODY MASS INDEX DOCD: CPT | Performed by: FAMILY MEDICINE

## 2025-04-30 PROCEDURE — 1159F MED LIST DOCD IN RCRD: CPT | Performed by: FAMILY MEDICINE

## 2025-04-30 PROCEDURE — 1126F AMNT PAIN NOTED NONE PRSNT: CPT | Performed by: FAMILY MEDICINE

## 2025-04-30 RX ORDER — OLOPATADINE HYDROCHLORIDE 2 MG/ML
1 SOLUTION OPHTHALMIC DAILY
Qty: 2.5 ML | Refills: 0 | Status: SHIPPED | OUTPATIENT
Start: 2025-04-30

## 2025-04-30 ASSESSMENT — PAIN SCALES - GENERAL: PAINLEVEL_OUTOF10: 0-NO PAIN

## 2025-04-30 ASSESSMENT — PATIENT HEALTH QUESTIONNAIRE - PHQ9
SUM OF ALL RESPONSES TO PHQ9 QUESTIONS 1 AND 2: 0
2. FEELING DOWN, DEPRESSED OR HOPELESS: NOT AT ALL
1. LITTLE INTEREST OR PLEASURE IN DOING THINGS: NOT AT ALL

## 2025-04-30 NOTE — PATIENT INSTRUCTIONS
Stop using the erythromycin ointment and Alaway eyedrops.  Use the new drops as directed.  Cool compress to the area.  Continue using allergy medicine such as Zyrtec.  Hydrate well with plenty of fluids throughout the day.

## 2025-04-30 NOTE — PROGRESS NOTES
"Subjective   Patient ID: Munira Bowen is a 74 y.o. female. They present today with a chief complaint of Eye Problem (Right eye redness, itching. Began about three days ago. Is using Alaway eye drops, erythromycin ointment).    Patient disposition: Home    History of Present Illness  HPI  Right eye redness and itching for the past 3 days.  Has been using Alaway, erythromycin and Zyrtec.  Continue to have irritation.  No blurriness.  Does not wear contacts or glasses.  No cold symptoms.  No fever or chills.  Left eye with no symptoms.  No other complaints or symptoms.      Past Medical History  Allergies as of 04/30/2025 - Reviewed 04/30/2025   Allergen Reaction Noted    Codeine Other 05/12/2023       Prescriptions Prior to Admission[1]     Current Medications[2]    Problem List[3]    Surgical History[4]     reports that she quit smoking about 5 years ago. Her smoking use included cigarettes. She started smoking about 57 years ago. She has a 13 pack-year smoking history. She has never used smokeless tobacco. She reports current alcohol use of about 14.0 standard drinks of alcohol per week. She reports that she does not use drugs.    Review of Systems  As noted in HPI. ROS otherwise negative unless noted.       Objective    Vitals:    04/30/25 1423   BP: 119/67   Pulse: 85   Resp: 16   Temp: 37.1 °C (98.7 °F)   TempSrc: Oral   SpO2: 96%   Weight: 59.9 kg (132 lb)   Height: 1.473 m (4' 10\")     No LMP recorded. Patient is postmenopausal.    Physical Exam  Constitutional: vital signs reviewed. Well developed, well nourished. patient alert and patient without distress.   Head and Face: Normal and atraumatic.    Eyes: EOMI, PERRLA, right eye with mild scleral and palpebral injection, no drainage.  No foreign body.  Left eye normal.,  No bony tenderness surrounding.  No skin tenderness.  Neck: No neck mass was observed. Supple. normal muscle tone.   Cardiovascular: Heart rate normal, normal S1 and S2, no gallops, no " murmurs and no pericardial rub. Rhythm: Normal.  Pulmonary: No respiratory distress. Palpation of chest: Normal. Clear bilateral breath sounds.   Lymphatic: No cervical lymphadenopathy        Procedures    Point of Care Test & Imaging Results from this visit  Results for orders placed or performed in visit on 04/11/25   Comprehensive Metabolic Panel    Collection Time: 04/11/25 10:39 AM   Result Value Ref Range    GLUCOSE 96 65 - 99 mg/dL    UREA NITROGEN (BUN) 17 7 - 25 mg/dL    CREATININE 0.85 0.60 - 1.00 mg/dL    EGFR 72 > OR = 60 mL/min/1.73m2    SODIUM 139 135 - 146 mmol/L    POTASSIUM 4.7 3.5 - 5.3 mmol/L    CHLORIDE 104 98 - 110 mmol/L    CARBON DIOXIDE 26 20 - 32 mmol/L    ELECTROLYTE BALANCE 9 7 - 17 mmol/L (calc)    CALCIUM 9.2 8.6 - 10.4 mg/dL    PROTEIN, TOTAL 6.6 6.1 - 8.1 g/dL    ALBUMIN 4.4 3.6 - 5.1 g/dL    BILIRUBIN, TOTAL 0.8 0.2 - 1.2 mg/dL    ALKALINE PHOSPHATASE 69 37 - 153 U/L    AST 26 10 - 35 U/L    ALT 18 6 - 29 U/L   Lipid Panel    Collection Time: 04/11/25 10:39 AM   Result Value Ref Range    CHOLESTEROL, TOTAL 160 <200 mg/dL    HDL CHOLESTEROL 67 > OR = 50 mg/dL    TRIGLYCERIDES 96 <150 mg/dL    LDL-CHOLESTEROL 75 mg/dL (calc)    CHOL/HDLC RATIO 2.4 <5.0 (calc)    NON HDL CHOLESTEROL 93 <130 mg/dL (calc)            Diagnostic study results (if any) were reviewed.  (If applicable) preliminary radiology reading: [none]    Assessment/Plan   Allergies, medications, history, and pertinent labs/EKGs/Imaging reviewed.        Medical Decision Making  See note    Orders and Diagnoses  There are no diagnoses linked to this encounter.    Medical Admin Record      Follow Up Instructions  No follow-ups on file.    [At time of discharge patient was clinically well-appearing and HDS for outpatient management. The patient and/or family was educated regarding diagnosis, supportive care, OTC and Rx medications. The patient and/or family was given the opportunity to ask questions prior to discharge and all  questions answered. They verbalized understanding of my discussion of the plans for treatment, expected course, indications to return to  or seek further evaluation in ED, and the need for timely follow up as directed. ]      Electronically signed by [facility]           [1] (Not in a hospital admission)  [2]   Current Outpatient Medications   Medication Sig Dispense Refill    allopurinol (Zyloprim) 100 mg tablet Take 1 tablet (100 mg) by mouth once daily. 90 tablet 0    atorvastatin (Lipitor) 40 mg tablet Take 1 tablet (40 mg) by mouth once daily. 90 tablet 1    citalopram (CeleXA) 20 mg tablet Take 1 tablet (20 mg) by mouth once daily. 90 tablet 0    multivitamin tablet Take 1 tablet by mouth once daily.      traZODone (Desyrel) 50 mg tablet TAKE 1/2 to 1 TABLETs AT BEDTIME to help with sleep 90 tablet 0     No current facility-administered medications for this visit.   [3]   Patient Active Problem List  Diagnosis    Adjustment disorder with anxiety    CKD (chronic kidney disease) stage 2, GFR 60-89 ml/min    Dermatitis, eczematoid    Gout    Hyperlipidemia    Insomnia    Pham's neuroma of right foot    ALCIDES on CPAP    Urinary frequency    Health care maintenance    Post-menopausal    Osteopenia of lumbar spine    Bilateral hearing loss    Primary osteoarthritis of first carpometacarpal joint of right hand   [4]   Past Surgical History:  Procedure Laterality Date    BACK SURGERY  04/16/2018    Back Surgery    COLONOSCOPY  04/16/2018    Complete Colonoscopy    MR HEAD ANGIO WO IV CONTRAST  12/23/2016    MR HEAD ANGIO WO IV CONTRAST 12/23/2016 [facility] [place] [facility]LL[facility]    OTHER SURGICAL HISTORY  03/02/2021    Colonoscopy    TUBAL LIGATION  05/02/2014    Tubal Ligation

## 2025-05-07 ENCOUNTER — APPOINTMENT (OUTPATIENT)
Facility: CLINIC | Age: 75
End: 2025-05-07
Payer: MEDICARE

## 2025-05-12 ENCOUNTER — OFFICE VISIT (OUTPATIENT)
Dept: URGENT CARE | Age: 75
End: 2025-05-12
Payer: MEDICARE

## 2025-05-12 VITALS
HEIGHT: 58 IN | HEART RATE: 67 BPM | WEIGHT: 130 LBS | OXYGEN SATURATION: 97 % | BODY MASS INDEX: 27.29 KG/M2 | DIASTOLIC BLOOD PRESSURE: 83 MMHG | SYSTOLIC BLOOD PRESSURE: 128 MMHG | RESPIRATION RATE: 18 BRPM | TEMPERATURE: 98.2 F

## 2025-05-12 DIAGNOSIS — H01.113 ALLERGIC BLEPHARITIS, RIGHT: Primary | ICD-10-CM

## 2025-05-12 PROCEDURE — 1159F MED LIST DOCD IN RCRD: CPT | Performed by: FAMILY MEDICINE

## 2025-05-12 PROCEDURE — 99213 OFFICE O/P EST LOW 20 MIN: CPT | Performed by: FAMILY MEDICINE

## 2025-05-12 PROCEDURE — 3008F BODY MASS INDEX DOCD: CPT | Performed by: FAMILY MEDICINE

## 2025-05-12 PROCEDURE — 1036F TOBACCO NON-USER: CPT | Performed by: FAMILY MEDICINE

## 2025-05-12 RX ORDER — KETOROLAC TROMETHAMINE 5 MG/ML
1 SOLUTION OPHTHALMIC 2 TIMES DAILY PRN
Qty: 5 ML | Refills: 0 | Status: SHIPPED | OUTPATIENT
Start: 2025-05-12 | End: 2025-05-22

## 2025-05-12 ASSESSMENT — ENCOUNTER SYMPTOMS
OCCASIONAL FEELINGS OF UNSTEADINESS: 0
DEPRESSION: 0
LOSS OF SENSATION IN FEET: 0

## 2025-05-12 ASSESSMENT — PATIENT HEALTH QUESTIONNAIRE - PHQ9
SUM OF ALL RESPONSES TO PHQ9 QUESTIONS 1 AND 2: 0
1. LITTLE INTEREST OR PLEASURE IN DOING THINGS: NOT AT ALL
2. FEELING DOWN, DEPRESSED OR HOPELESS: NOT AT ALL

## 2025-05-12 NOTE — PROGRESS NOTES
"Subjective   Patient ID: Munira Bowen is a 74 y.o. female. They present today with a chief complaint of Eye Problem (3 weeks/Bilateral eye itching / redness / burning).    History of Present Illness  HPI  3-week of a right sided red upper eyelid with swelling.  Patient states that she has a lifetime of allergy problems and [place].  She was seen here 2 weeks ago and prescribed Pataday eyedrops which she states she is still using.  There is no known exposure to pinkeye.  They deny fevers or chills.  Denies any trauma to the eyes.  No ear pain or sore throats.  She also uses daily Zyrtec and Flonase nasal spray.  Past Medical History  Allergies as of 05/12/2025 - Reviewed 05/12/2025   Allergen Reaction Noted    Codeine Other 05/12/2023       Prescriptions Prior to Admission[1]     Medical History[2]    Surgical History[3]     reports that she quit smoking about 5 years ago. Her smoking use included cigarettes. She started smoking about 57 years ago. She has a 13 pack-year smoking history. She has never used smokeless tobacco. She reports current alcohol use of about 14.0 standard drinks of alcohol per week. She reports that she does not use drugs.    Review of Systems  Review of Systems       As in history of present illness                        Objective    Vitals:    05/12/25 1239   BP: 128/83   BP Location: Right arm   Patient Position: Sitting   BP Cuff Size: Adult   Pulse: 67   Resp: 18   Temp: 36.8 °C (98.2 °F)   TempSrc: Oral   SpO2: 97%   Weight: 59 kg (130 lb)   Height: 1.473 m (4' 10\")     No LMP recorded. Patient is postmenopausal.    Physical Exam  Alert and oriented, no apparent distress  ENT-ear canals and tympanic membranes appear normal bilaterally.  Nasal passages clear with no discharge.  Oropharynx unremarkable with no erythema or exudates  Eyes-conjunctival appear nonerythematous and unremarkable bilaterally.  Right upper eyelid mildly erythematous and swollen with mild " tenderness.  No foreign body or exudate noted.  No obvious stye  Neck-no swelling or lymphadenopathy noted  Skin-see eye exam  Procedures    Point of Care Test & Imaging Results from this visit  No results found for this visit on 05/12/25.   Imaging  No results found.    Cardiology, Vascular, and Other Imaging  No other imaging results found for the past 2 days      Diagnostic study results (if any) were reviewed by Ko Fajardo MD.    Assessment/Plan   Allergies, medications, history, and pertinent labs/EKGs/Imaging reviewed by Ko Fajardo MD.     Medical Decision Making  At time of discharge patient was clinically well-appearing and HDS for outpatient management. The patient and/or family was educated regarding diagnosis, supportive care, OTC and Rx medications. The patient and/or family was given the opportunity to ask questions prior to discharge.  They verbalized understanding of my discussion of the plans for treatment, expected course, indications to return to  or seek further evaluation in ED, and the need for timely follow up as directed.   They were provided with a work/school excuse if requested.    Orders and Diagnoses  Diagnoses and all orders for this visit:  Allergic blepharitis, right  -     ketorolac (Acular) 0.5 % ophthalmic solution; Administer 1 drop into the right eye 2 times a day as needed (Eye irritation) for up to 10 days.      Medical Admin Record      Patient disposition: Home    Electronically signed by Ko Fajardo MD  1:13 PM           [1] (Not in a hospital admission)   [2]   Past Medical History:  Diagnosis Date    Abdominal distension (gaseous) 02/17/2020    Abdominal bloating    Abnormal results of liver function studies     Abnormal liver function    Acute upper respiratory infection, unspecified 03/17/2020    Viral URI with cough    Cellulitis of right lower limb 07/24/2020    Cellulitis of knee, right    Elevated blood-pressure reading, without diagnosis of hypertension  06/03/2021    Elevated blood pressure reading    Essential (primary) hypertension 06/17/2016    Benign essential hypertension    Flatulence 01/29/2020    Flatulence    Influenza due to other identified influenza virus with other respiratory manifestations 03/17/2020    Influenza B    Other conditions influencing health status     Normal colonoscopy    Other general symptoms and signs 06/03/2021    Flu-like symptoms    Other specified anxiety disorders 06/03/2021    Depression with anxiety    Personal history of diseases of the blood and blood-forming organs and certain disorders involving the immune mechanism 12/20/2019    History of anemia    Personal history of other diseases of the digestive system 11/11/2013    History of irritable bowel syndrome    Personal history of other diseases of the digestive system 06/03/2021    History of hematemesis    Personal history of other diseases of urinary system 12/03/2021    History of hematuria    Personal history of other medical treatment 01/16/2017    History of screening mammography    Personal history of other medical treatment     H/O mammogram    Personal history of other specified conditions 12/20/2019    History of abdominal pain    Personal history of other specified conditions 06/03/2021    History of heartburn    Personal history of other specified conditions 06/03/2021    History of nausea and vomiting    Plantar fascial fibromatosis 07/18/2019    Plantar fasciitis, left    Right upper quadrant pain 04/16/2018    RUQ pain    Unspecified injury of unspecified wrist, hand and finger(s), initial encounter 06/03/2021    Wrist injury   [3]   Past Surgical History:  Procedure Laterality Date    BACK SURGERY  04/16/2018    Back Surgery    COLONOSCOPY  04/16/2018    Complete Colonoscopy    MR HEAD ANGIO WO IV CONTRAST  12/23/2016    MR HEAD ANGIO WO IV CONTRAST 12/23/2016 [facility]    OTHER SURGICAL HISTORY  03/02/2021    Colonoscopy    TUBAL LIGATION   05/02/2014    Tubal Ligation

## 2025-05-12 NOTE — PATIENT INSTRUCTIONS
Switch to Acular eyedrops twice a day as needed  Apply cool compress to right eye several times a day  May use OTC hydrocortisone cream to outer eyelid twice a day for up to 5 days  Follow-up if worsening  See allergist as discussed in office.  Patient declining referral

## 2025-05-14 ENCOUNTER — OFFICE VISIT (OUTPATIENT)
Dept: PRIMARY CARE | Facility: CLINIC | Age: 75
End: 2025-05-14
Payer: MEDICARE

## 2025-05-14 VITALS — DIASTOLIC BLOOD PRESSURE: 81 MMHG | TEMPERATURE: 98.5 F | SYSTOLIC BLOOD PRESSURE: 142 MMHG

## 2025-05-14 DIAGNOSIS — H10.13 ALLERGIC CONJUNCTIVITIS OF BOTH EYES: Primary | ICD-10-CM

## 2025-05-14 PROCEDURE — 99213 OFFICE O/P EST LOW 20 MIN: CPT | Performed by: FAMILY MEDICINE

## 2025-05-14 PROCEDURE — 1159F MED LIST DOCD IN RCRD: CPT | Performed by: FAMILY MEDICINE

## 2025-05-14 PROCEDURE — 1036F TOBACCO NON-USER: CPT | Performed by: FAMILY MEDICINE

## 2025-05-14 PROCEDURE — 1160F RVW MEDS BY RX/DR IN RCRD: CPT | Performed by: FAMILY MEDICINE

## 2025-05-14 RX ORDER — FEXOFENADINE HCL 180 MG/1
180 TABLET ORAL DAILY
Qty: 30 TABLET | Refills: 5 | Status: SHIPPED | OUTPATIENT
Start: 2025-05-14 | End: 2025-05-14

## 2025-05-14 RX ORDER — PIMECROLIMUS 10 MG/G
CREAM TOPICAL 2 TIMES DAILY
Qty: 30 G | Refills: 0 | Status: SHIPPED | OUTPATIENT
Start: 2025-05-14 | End: 2025-05-14

## 2025-05-14 NOTE — PROGRESS NOTES
Subjective   Patient ID: [id number]     Munira Bowen is a 74 y.o. female who presents for Eye Problem (Eye redness, burning and itching for about 3 weeks; went to urgent care twice and was given 2 different eye drops which aren't working).    HPI  Munira is on olpatadine and ketorolac eye drops but continues to have slwollen itchy eyes that she cannot stop itching    Review of Systems  GEN-no fever or chills  OPTH-No  blurry vision  ENT-sneezing      Objective     /81 (BP Location: Left arm, Patient Position: Sitting)   Temp 36.9 °C (98.5 °F) (Oral)      Physical Exam  General- well defined, well nourished, well hydrated individual in NAD  Skin- normal color and turgor  Head-not tender to percussion  Ears-normal pinnae, and canals, with normal landmarks and light reflex of tympanic membranes bilaterally  Nose-septum in the midline, normal mucosa bilaterally  Throat- without erythema or exudate, uvula in midline  Neck-supple without lymphadenopathy or thyromegaly; no carotid bruits  Heart- regular rate and rhythm, normal s1 and s2 without murmur or gallop; peripheral pulses 2+ and symmetrical  Lungs-clear to auscultation    Assessment/Plan     Problem List Items Addressed This Visit       Allergic conjunctivitis of both eyes - Primary    Relevant Medications    fexofenadine (Allegra) 180 mg tablet    pimecrolimus (Elidel) 1 % cream       Please follow up up the allergist but remember, you cannot itch your eyes.    Smith Avina MD

## 2025-05-15 RX ORDER — FEXOFENADINE HCL 180 MG/1
180 TABLET ORAL DAILY
Qty: 30 TABLET | Refills: 5 | Status: SHIPPED | OUTPATIENT
Start: 2025-05-15 | End: 2026-05-15

## 2025-05-15 RX ORDER — PIMECROLIMUS 10 MG/G
CREAM TOPICAL 2 TIMES DAILY
Qty: 30 G | Refills: 0 | Status: SHIPPED | OUTPATIENT
Start: 2025-05-15

## 2025-05-19 ENCOUNTER — APPOINTMENT (OUTPATIENT)
Facility: CLINIC | Age: 75
End: 2025-05-19
Payer: MEDICARE

## 2025-05-19 VITALS
SYSTOLIC BLOOD PRESSURE: 134 MMHG | OXYGEN SATURATION: 95 % | HEART RATE: 63 BPM | HEIGHT: 58 IN | BODY MASS INDEX: 28.97 KG/M2 | DIASTOLIC BLOOD PRESSURE: 73 MMHG | RESPIRATION RATE: 18 BRPM | WEIGHT: 138 LBS

## 2025-05-19 DIAGNOSIS — E66.3 OVERWEIGHT (BMI 25.0-29.9): ICD-10-CM

## 2025-05-19 DIAGNOSIS — G47.33 OSA ON CPAP: Primary | ICD-10-CM

## 2025-05-19 DIAGNOSIS — G47.50 PARASOMNIA, UNSPECIFIED TYPE: ICD-10-CM

## 2025-05-19 PROCEDURE — G8433 SCR FOR DEP NOT CPT DOC RSN: HCPCS | Performed by: GENERAL PRACTICE

## 2025-05-19 PROCEDURE — 1160F RVW MEDS BY RX/DR IN RCRD: CPT | Performed by: GENERAL PRACTICE

## 2025-05-19 PROCEDURE — 1036F TOBACCO NON-USER: CPT | Performed by: GENERAL PRACTICE

## 2025-05-19 PROCEDURE — 99204 OFFICE O/P NEW MOD 45 MIN: CPT | Performed by: GENERAL PRACTICE

## 2025-05-19 PROCEDURE — G2211 COMPLEX E/M VISIT ADD ON: HCPCS | Performed by: GENERAL PRACTICE

## 2025-05-19 PROCEDURE — 1159F MED LIST DOCD IN RCRD: CPT | Performed by: GENERAL PRACTICE

## 2025-05-19 PROCEDURE — 3008F BODY MASS INDEX DOCD: CPT | Performed by: GENERAL PRACTICE

## 2025-05-19 ASSESSMENT — ENCOUNTER SYMPTOMS
DEPRESSION: 0
LOSS OF SENSATION IN FEET: 0
OCCASIONAL FEELINGS OF UNSTEADINESS: 0

## 2025-05-19 ASSESSMENT — COLUMBIA-SUICIDE SEVERITY RATING SCALE - C-SSRS
2. HAVE YOU ACTUALLY HAD ANY THOUGHTS OF KILLING YOURSELF?: NO
1. IN THE PAST MONTH, HAVE YOU WISHED YOU WERE DEAD OR WISHED YOU COULD GO TO SLEEP AND NOT WAKE UP?: NO
6. HAVE YOU EVER DONE ANYTHING, STARTED TO DO ANYTHING, OR PREPARED TO DO ANYTHING TO END YOUR LIFE?: NO

## 2025-05-19 NOTE — PROGRESS NOTES
Patient: Munira Bowen    [id number]  : 1950 -- AGE 74 y.o.    Provider: Jeromy Bailey DO     Location [facility]   Service Date: 2025              [facility]  New Visit Note        HISTORY OF PRESENT ILLNESS     The patient's referring provider is: No ref. provider found    HISTORY OF PRESENT ILLNESS   Munira Bowen is a 74 y.o. female who presents to a [facility] for a sleep medicine evaluation with concerns of Sleep Apnea.     The patient  has a past medical history of Abdominal distension (gaseous) (2020), Abnormal results of liver function studies, Acute upper respiratory infection, unspecified (2020), Cellulitis of right lower limb (2020), Elevated blood-pressure reading, without diagnosis of hypertension (2021), Essential (primary) hypertension (2016), Flatulence (2020), Influenza due to other identified influenza virus with other respiratory manifestations (2020), Other conditions influencing health status, Other general symptoms and signs (2021), Other specified anxiety disorders (2021), Personal history of diseases of the blood and blood-forming organs and certain disorders involving the immune mechanism (2019), Personal history of other diseases of the digestive system (2013), Personal history of other diseases of the digestive system (2021), Personal history of other diseases of urinary system (2021), Personal history of other medical treatment (2017), Personal history of other medical treatment, Personal history of other specified conditions (2019), Personal history of other specified conditions (2021), Personal history of other specified conditions (2021), Plantar fascial fibromatosis (2019), Right upper quadrant pain (2018), and Unspecified injury of unspecified wrist, hand and  finger(s), initial encounter (06/03/2021)..    PAST SLEEP HISTORY    patient had a colonoscopy done and the anesthesiologist recommended that patient gets tested for sleep apnea.       CURRENT HISTORY    On today's visit, 5/19/2025, the patient reports that she is trying to use pap therapy regularly.  She needs an order for supplies.    Sometimes she feels uncomfortable with the headgear and takes off her mask. She also feels that she needs more pressure when she first turns on her machine.     PAP Related Problems: leak perceived (sometimes ) and snoring with PAP (rare), but no air hunger/need more pressure, admits to dry mouth, no mask intolerance, no skin irritation from mask and no taking off mask without recall.     Perceived Benefits of PAP Therapy: refreshing sleep, decreased nocturnal LANI, decreased dry mouth or sore throat, decreased daytime sleepiness, decreased nocturnal awakenings, decreased snoring/ choking/ gasping, decreased morning headaches and improved memory and/or concentration.     Sleep schedule  on weekdays / work days:  Usual Bedtime: 10-10:30pm  Sleep latency: few min   Wake time : 7:30am-8am  Total sleep time average/day: 9 hours/day  Awakenings: 3-5x per night, nocturia, short   Naps: 1:30pmm, 15-30min, 2x per week. Not with pap therapy.       Sleep schedule  on weekends/non work days :  Same as above.     Sleep aids: trazodone to help her fall asleep faster.  Stimulants: no    Occupation:  retired end 2020; was working at [employer].     Preferred sleeping position: SLEEP POSITION: sidelying    Sleep-related ROS:    Snoring:  sometimes  Witnessed apneas:  sometimes      Gasping/ choking: n  Am Dry mouth:  occasionally            Nasal congestion:  y       am headaches: sometimes    Sleep is described as unrefreshing.     Daytime sleepiness: y  Fatigue or decreased energy: y  Difficulty remembering things in daytime: n  Difficulty staying focused in daytime: : n  Irritable  during the day: sometimes    Drowsy driving: n  Hx of car accident: n  Near-miss Car accident: n      RLS screen:  RLSSCREEN: - Sensations: Patient does not have unusual sensations in their extremities that cause an urge to move them   Sometimes she feels that she needs to move legs but the sensation does not improve with movement.     Sleep-related behaviors: in 2023? She woke up and found herself at the kitchen. She believes that it may be related to stress.     ESS: 0      REVIEW OF SYSTEMS     REVIEW OF SYSTEMS  Review of Systems   All other systems reviewed and are negative.      ALLERGIES AND MEDICATIONS     ALLERGIES  Allergies[1]    MEDICATIONS  Current Medications[2]      PAST HISTORY     PAST MEDICAL HISTORY  She  has a past medical history of Abdominal distension (gaseous) (02/17/2020), Abnormal results of liver function studies, Acute upper respiratory infection, unspecified (03/17/2020), Cellulitis of right lower limb (07/24/2020), Elevated blood-pressure reading, without diagnosis of hypertension (06/03/2021), Essential (primary) hypertension (06/17/2016), Flatulence (01/29/2020), Influenza due to other identified influenza virus with other respiratory manifestations (03/17/2020), Other conditions influencing health status, Other general symptoms and signs (06/03/2021), Other specified anxiety disorders (06/03/2021), Personal history of diseases of the blood and blood-forming organs and certain disorders involving the immune mechanism (12/20/2019), Personal history of other diseases of the digestive system (11/11/2013), Personal history of other diseases of the digestive system (06/03/2021), Personal history of other diseases of urinary system (12/03/2021), Personal history of other medical treatment (01/16/2017), Personal history of other medical treatment, Personal history of other specified conditions (12/20/2019), Personal history of other specified conditions (06/03/2021), Personal history of  "other specified conditions (06/03/2021), Plantar fascial fibromatosis (07/18/2019), Right upper quadrant pain (04/16/2018), and Unspecified injury of unspecified wrist, hand and finger(s), initial encounter (06/03/2021).      PAST SURGICAL HISTORY:  Surgical History[3]    FAMILY HISTORY  Family History[4]  DOES/DOES NOT EC: does not have a family history of sleep disorder.      SOCIAL HISTORY  She  reports that she quit smoking about 5 years ago. Her smoking use included cigarettes. She started smoking about 57 years ago. She has a 13 pack-year smoking history. She has never used smokeless tobacco. She reports current alcohol use of about 14.0 standard drinks of alcohol per week. She reports that she does not use drugs.     Caffeine consumption: 2 cups of coffee in am  Alcohol consumption: 1-2 vodka sometimes at night. 4-5x per week.   Smoking: no  Marijuana: no  Other drugs: no      PHYSICAL EXAM     VITAL SIGNS: /73   Pulse 63   Resp 18   Ht 1.473 m (4' 10\")   Wt 62.6 kg (138 lb)   SpO2 95%   BMI 28.84 kg/m²      PREVIOUS WEIGHTS:  Wt Readings from Last 3 Encounters:   05/19/25 62.6 kg (138 lb)   05/12/25 59 kg (130 lb)   04/30/25 59.9 kg (132 lb)       Physical Exam  Constitutional: Alert and oriented, cooperative, no obvious distress.   HENT: normocephalic.   Neurologic: AOx3.   psychiatric: appropriate mood and affect.  Integumentary: no significant rashes observed over pap mask area.         RESULTS/DATA         ASSESSMENT/PLAN     Ms. Bowen is a 74 y.o. female and  has a past medical history of Abdominal distension (gaseous) (02/17/2020), Abnormal results of liver function studies, Acute upper respiratory infection, unspecified (03/17/2020), Cellulitis of right lower limb (07/24/2020), Elevated blood-pressure reading, without diagnosis of hypertension (06/03/2021), Essential (primary) hypertension (06/17/2016), Flatulence (01/29/2020), Influenza due to other identified influenza virus with " other respiratory manifestations (03/17/2020), Other conditions influencing health status, Other general symptoms and signs (06/03/2021), Other specified anxiety disorders (06/03/2021), Personal history of diseases of the blood and blood-forming organs and certain disorders involving the immune mechanism (12/20/2019), Personal history of other diseases of the digestive system (11/11/2013), Personal history of other diseases of the digestive system (06/03/2021), Personal history of other diseases of urinary system (12/03/2021), Personal history of other medical treatment (01/16/2017), Personal history of other medical treatment, Personal history of other specified conditions (12/20/2019), Personal history of other specified conditions (06/03/2021), Personal history of other specified conditions (06/03/2021), Plantar fascial fibromatosis (07/18/2019), Right upper quadrant pain (04/16/2018), and Unspecified injury of unspecified wrist, hand and finger(s), initial encounter (06/03/2021). She is following up on sleep apnea.     Problem List Items Addressed This Visit       ALCIDES on CPAP - Primary    Relevant Orders    Positive Airway Pressure (PAP) Therapy     Other Visit Diagnoses         Overweight (BMI 25.0-29.9)                Problem List and Orders  Pmhx includes Anxiety, insomnia on trazodone, seasonal allergies, gout, CKD she is here for sleep apnea.       1- ALCIDES  HST 3/29/2021--> moderate ALCIDES, AHI 3% 29.4, AHI 4% 23.3, SpO2 devin 64.5%.  -nocturnal pulse ox 7/2/21, <=88% 3.5min while on Autopap 5-15cwp.     Reviewed and discussed the above sleep study results and management options in details. All questions answered, patient verbalizes understanding.     -Patient feels uncomfortable and feels that she needs more pressure when she turns on her machine--> stop ramp and adjust settings as below.   -Adjusting from AutoPap 5-15 CWP, AHI 3.1, median pressure of 8.2, maximum average 11.7--> to Autopap 8-15cwp.      -Ordered supplies and mask refitting through [facility].  -Provided simple mask in clinic    -do not drive or operate heavy machinery if drowsy.  -avoid sleeping on your back.   -avoid sedating substances/ medication, alcohol, illicit drugs and tobacco.    2- Overweight   counseled on eating a healthy diet and exercising as tolerated.    3- parasomnia  Sleep walking, last in 2023?  She believes it is related to her stress.    Please follow safety precautions. such as Pading of the sleeping area and surrounding furniture  Lower bd/ mattress to the floor and/or use a ground-floor bedroom. try to secure doors and windows (locks, alarms, barriers)  Remove sharp and dangerous objects from bedroom, including firearms  try to identify and avoid triggers.   Keep a log of sleepwalking events.   try to avoid sleep disturbances, stress, keep a regular sleep schedule, use  your pap therapy regularly.     Follow up in 4 months or sooner as needed.            [1]   Allergies  Allergen Reactions    Codeine Other   [2]   Current Outpatient Medications   Medication Sig Dispense Refill    allopurinol (Zyloprim) 100 mg tablet Take 1 tablet (100 mg) by mouth once daily. 90 tablet 0    atorvastatin (Lipitor) 40 mg tablet Take 1 tablet (40 mg) by mouth once daily. 90 tablet 1    citalopram (CeleXA) 20 mg tablet Take 1 tablet (20 mg) by mouth once daily. 90 tablet 0    fexofenadine (Allegra) 180 mg tablet Take 1 tablet (180 mg) by mouth once daily. 30 tablet 5    ketorolac (Acular) 0.5 % ophthalmic solution Administer 1 drop into the right eye 2 times a day as needed (Eye irritation) for up to 10 days. 5 mL 0    multivitamin tablet Take 1 tablet by mouth once daily.      olopatadine (Pataday) 0.2 % ophthalmic solution Administer 1 drop into the right eye once daily. 2.5 mL 0    pimecrolimus (Elidel) 1 % cream Apply topically 2 times a day. 30 g 0    traZODone (Desyrel) 50 mg tablet TAKE 1/2 to 1 TABLETs AT BEDTIME to help with sleep 90  tablet 0     No current facility-administered medications for this visit.   [3]   Past Surgical History:  Procedure Laterality Date    BACK SURGERY  04/16/2018    Back Surgery    COLONOSCOPY  04/16/2018    Complete Colonoscopy    MR HEAD ANGIO WO IV CONTRAST  12/23/2016    MR HEAD ANGIO WO IV CONTRAST 12/23/2016 [facility]    OTHER SURGICAL HISTORY  03/02/2021    Colonoscopy    TUBAL LIGATION  05/02/2014    Tubal Ligation   [4]   Family History  Problem Relation Name Age of Onset    Depression Mother      Alzheimer's disease Mother      Other (cardiac arrhythmia) Mother      Hypertension Father      Depression Brother      Other (Other) Brother

## 2025-08-04 ENCOUNTER — TELEPHONE (OUTPATIENT)
Dept: PRIMARY CARE | Facility: CLINIC | Age: 75
End: 2025-08-04
Payer: MEDICARE

## 2025-08-04 DIAGNOSIS — F43.22 ADJUSTMENT DISORDER WITH ANXIETY: ICD-10-CM

## 2025-08-04 DIAGNOSIS — G47.00 INSOMNIA, UNSPECIFIED TYPE: ICD-10-CM

## 2025-08-04 DIAGNOSIS — M10.9 GOUT, UNSPECIFIED CAUSE, UNSPECIFIED CHRONICITY, UNSPECIFIED SITE: ICD-10-CM

## 2025-08-04 DIAGNOSIS — E78.5 HYPERLIPIDEMIA, UNSPECIFIED HYPERLIPIDEMIA TYPE: ICD-10-CM

## 2025-08-04 RX ORDER — ALLOPURINOL 100 MG/1
100 TABLET ORAL DAILY
Qty: 90 TABLET | Refills: 0 | Status: SHIPPED | OUTPATIENT
Start: 2025-08-04

## 2025-08-04 RX ORDER — TRAZODONE HYDROCHLORIDE 50 MG/1
TABLET ORAL
Qty: 90 TABLET | Refills: 0 | Status: SHIPPED | OUTPATIENT
Start: 2025-08-04

## 2025-08-04 RX ORDER — CITALOPRAM 20 MG/1
20 TABLET ORAL DAILY
Qty: 90 TABLET | Refills: 0 | Status: SHIPPED | OUTPATIENT
Start: 2025-08-04

## 2025-08-04 RX ORDER — ATORVASTATIN CALCIUM 40 MG/1
40 TABLET, FILM COATED ORAL DAILY
Qty: 90 TABLET | Refills: 0 | Status: SHIPPED | OUTPATIENT
Start: 2025-08-04

## 2025-08-04 NOTE — TELEPHONE ENCOUNTER
Refill request     Med name-allopurinol  Med dose-100mg  Med directions-take 1 tablet daily  LR-02/12/25    Med name-citalopram   Med dose-20mg  Med directions-take 1 tablet daily  LR-02/12/25    Med name-trazodone  Med dose-50mg  Med directions-take 1/2 to 1 tablet at bedtime to help with sleep  LR-02/12/25    Med name-atorvastatin  Med dose-40mg  Med directions-take 1 tablet daily  LR-looks like it was never sent to a pharmacy-last fill was 80mg(that was discontinued)    [facility]    LV-04/11/25  Nv-09/22/25    Thanks

## 2025-08-18 ENCOUNTER — APPOINTMENT (OUTPATIENT)
Dept: PRIMARY CARE | Facility: CLINIC | Age: 75
End: 2025-08-18
Payer: MEDICARE

## 2025-08-22 ENCOUNTER — APPOINTMENT (OUTPATIENT)
Facility: CLINIC | Age: 75
End: 2025-08-22
Payer: MEDICARE

## 2025-08-22 VITALS
SYSTOLIC BLOOD PRESSURE: 122 MMHG | HEIGHT: 58 IN | WEIGHT: 138 LBS | BODY MASS INDEX: 28.97 KG/M2 | DIASTOLIC BLOOD PRESSURE: 78 MMHG | TEMPERATURE: 97.6 F | HEART RATE: 67 BPM | OXYGEN SATURATION: 94 %

## 2025-08-22 DIAGNOSIS — G47.50 PARASOMNIA, UNSPECIFIED TYPE: ICD-10-CM

## 2025-08-22 DIAGNOSIS — G47.33 OSA ON CPAP: Primary | ICD-10-CM

## 2025-08-22 DIAGNOSIS — E66.3 OVERWEIGHT (BMI 25.0-29.9): ICD-10-CM

## 2025-08-22 PROCEDURE — 3008F BODY MASS INDEX DOCD: CPT | Performed by: GENERAL PRACTICE

## 2025-08-22 PROCEDURE — G2211 COMPLEX E/M VISIT ADD ON: HCPCS | Performed by: GENERAL PRACTICE

## 2025-08-22 PROCEDURE — 99214 OFFICE O/P EST MOD 30 MIN: CPT | Performed by: GENERAL PRACTICE

## 2025-08-22 PROCEDURE — 1159F MED LIST DOCD IN RCRD: CPT | Performed by: GENERAL PRACTICE

## 2025-08-22 PROCEDURE — 1126F AMNT PAIN NOTED NONE PRSNT: CPT | Performed by: GENERAL PRACTICE

## 2025-08-22 ASSESSMENT — PATIENT HEALTH QUESTIONNAIRE - PHQ9
1. LITTLE INTEREST OR PLEASURE IN DOING THINGS: NOT AT ALL
2. FEELING DOWN, DEPRESSED OR HOPELESS: NOT AT ALL
SUM OF ALL RESPONSES TO PHQ9 QUESTIONS 1 AND 2: 0

## 2025-08-22 ASSESSMENT — PAIN SCALES - GENERAL: PAINLEVEL_OUTOF10: 0-NO PAIN

## 2025-09-10 ENCOUNTER — APPOINTMENT (OUTPATIENT)
Dept: PRIMARY CARE | Facility: CLINIC | Age: 75
End: 2025-09-10
Payer: COMMERCIAL

## 2025-09-22 ENCOUNTER — APPOINTMENT (OUTPATIENT)
Dept: PRIMARY CARE | Facility: CLINIC | Age: 75
End: 2025-09-22
Payer: MEDICARE

## 2026-08-21 ENCOUNTER — APPOINTMENT (OUTPATIENT)
Facility: CLINIC | Age: 76
End: 2026-08-21
Payer: MEDICARE